# Patient Record
Sex: FEMALE | Race: WHITE | NOT HISPANIC OR LATINO | Employment: OTHER | RURAL
[De-identification: names, ages, dates, MRNs, and addresses within clinical notes are randomized per-mention and may not be internally consistent; named-entity substitution may affect disease eponyms.]

---

## 2022-08-22 ENCOUNTER — OFFICE VISIT (OUTPATIENT)
Dept: FAMILY MEDICINE CLINIC | Facility: CLINIC | Age: 62
End: 2022-08-22

## 2022-08-22 VITALS — DIASTOLIC BLOOD PRESSURE: 84 MMHG | SYSTOLIC BLOOD PRESSURE: 140 MMHG | WEIGHT: 144 LBS

## 2022-08-22 DIAGNOSIS — E53.8 B12 DEFICIENCY: ICD-10-CM

## 2022-08-22 DIAGNOSIS — Z13.220 LIPID SCREENING: ICD-10-CM

## 2022-08-22 DIAGNOSIS — E03.9 ACQUIRED HYPOTHYROIDISM: ICD-10-CM

## 2022-08-22 DIAGNOSIS — R53.82 CHRONIC FATIGUE: Primary | ICD-10-CM

## 2022-08-22 DIAGNOSIS — F33.1 MODERATE EPISODE OF RECURRENT MAJOR DEPRESSIVE DISORDER: ICD-10-CM

## 2022-08-22 PROCEDURE — 99213 OFFICE O/P EST LOW 20 MIN: CPT | Performed by: STUDENT IN AN ORGANIZED HEALTH CARE EDUCATION/TRAINING PROGRAM

## 2022-08-22 PROCEDURE — 36415 COLL VENOUS BLD VENIPUNCTURE: CPT | Performed by: STUDENT IN AN ORGANIZED HEALTH CARE EDUCATION/TRAINING PROGRAM

## 2022-08-22 RX ORDER — LEVOTHYROXINE SODIUM 137 UG/1
TABLET ORAL
COMMUNITY
Start: 2022-06-26 | End: 2022-10-18 | Stop reason: SDUPTHER

## 2022-08-22 NOTE — PROGRESS NOTES
Chief Complaint  Fatigue    Subjective          Celestina Sullivan presents to [facility]  History of Present Illness    She states that she has had some worsening fatigue over the past several months. She states that she has had more trouble with her ADHD and depression as well as more stress with her son. She states that sheis concerned about low B12, vitamin D, or her thyroid being off.     She states otherwise she is doing well at this time.  Her hip is doing significantly better since having her surgery.  She states that she has been up and moving, however motivation is what she is lacking.    She has seen psychiatry in the past and attempted to establish with [facility], however due to having poor Internet service at her house she was unable to do virtual visit and still received a bill for a last-minute cancellation or no-show she states that she has not seen anyone since attempting to get involved with this group.        Objective   Vital Signs:   /84 (BP Location: Left arm, Patient Position: Sitting, Cuff Size: Adult)   Wt 65.3 kg (144 lb)     There is no height or weight on file to calculate BMI.    Review of Systems    Past History:  Medical History: has a past medical history of Anxiety, Hypothyroidism, and Neurotic depression.   Surgical History: has a past surgical history that includes Joint replacement.   Family History: family history includes Cancer in her mother; Depression in her mother.   Social History: reports that she has been smoking cigarettes. She has been smoking about 2.00 packs per day. She has never used smokeless tobacco. She reports that she does not use drugs.       Current Outpatient Medications:   •  levothyroxine (SYNTHROID, LEVOTHROID) 137 MCG tablet, , Disp: , Rfl:     Allergies: Patient has no allergy information on record.    Physical Exam  Constitutional:       General: She is not in acute distress.     Appearance: She is not ill-appearing or  toxic-appearing.   HENT:      Head: Normocephalic and atraumatic.   Cardiovascular:      Rate and Rhythm: Normal rate and regular rhythm.      Heart sounds: No murmur heard.  Pulmonary:      Effort: Pulmonary effort is normal. No respiratory distress.   Neurological:      General: No focal deficit present.      Mental Status: She is alert and oriented to person, place, and time.   Psychiatric:         Mood and Affect: Mood normal.         Thought Content: Thought content normal.          Result Review :                   Assessment and Plan    Diagnoses and all orders for this visit:    1. Chronic fatigue (Primary)  -     Vitamin B12 & Folate; Future  -     Comprehensive Metabolic Panel; Future  -     CBC & Differential; Future  -     TSH; Future  -     T4, Free; Future  -     Vitamin B12 & Folate  -     Comprehensive Metabolic Panel  -     CBC & Differential  -     TSH  -     T4, Free    2. Acquired hypothyroidism  -     TSH; Future  -     T4, Free; Future  -     TSH  -     T4, Free    3. B12 deficiency  -     Vitamin B12 & Folate; Future  -     Vitamin B12 & Folate    4. Lipid screening  -     Lipid Panel; Future  -     Lipid Panel    5. Moderate episode of recurrent major depressive disorder (HCC)    Patient due for blood work at this time.  We will contact her with results.  Advised that if her blood work does not show any significant abnormalities metabolically I recommend that she see psychiatry for further evaluation and management of ADHD, anxiety and depression, and history of bipolar disorder.  She is agreeable to this, but would like to discuss her blood work first.        Follow Up   No follow-ups on file.  Patient was given instructions and counseling regarding her condition or for health maintenance advice. Please see specific information pulled into the AVS if appropriate.     Myah Ghosh, DO

## 2022-08-23 LAB
ALBUMIN SERPL-MCNC: 5 G/DL (ref 3.8–4.8)
ALBUMIN/GLOB SERPL: 1.9 {RATIO} (ref 1.2–2.2)
ALP SERPL-CCNC: 124 IU/L (ref 44–121)
ALT SERPL-CCNC: 12 IU/L (ref 0–32)
AST SERPL-CCNC: 17 IU/L (ref 0–40)
BASOPHILS # BLD AUTO: 0.1 X10E3/UL (ref 0–0.2)
BASOPHILS NFR BLD AUTO: 1 %
BILIRUB SERPL-MCNC: <0.2 MG/DL (ref 0–1.2)
BUN SERPL-MCNC: 13 MG/DL (ref 8–27)
BUN/CREAT SERPL: 17 (ref 12–28)
CALCIUM SERPL-MCNC: 9.7 MG/DL (ref 8.7–10.3)
CHLORIDE SERPL-SCNC: 100 MMOL/L (ref 96–106)
CHOLEST SERPL-MCNC: 314 MG/DL (ref 100–199)
CO2 SERPL-SCNC: 22 MMOL/L (ref 20–29)
CREAT SERPL-MCNC: 0.75 MG/DL (ref 0.57–1)
EGFRCR-CYS SERPLBLD CKD-EPI 2021: 89 ML/MIN/1.73
EOSINOPHIL # BLD AUTO: 0.2 X10E3/UL (ref 0–0.4)
EOSINOPHIL NFR BLD AUTO: 2 %
ERYTHROCYTE [DISTWIDTH] IN BLOOD BY AUTOMATED COUNT: 13.6 % (ref 11.7–15.4)
FOLATE SERPL-MCNC: 8 NG/ML
GLOBULIN SER CALC-MCNC: 2.7 G/DL (ref 1.5–4.5)
GLUCOSE SERPL-MCNC: 84 MG/DL (ref 65–99)
HCT VFR BLD AUTO: 40.8 % (ref 34–46.6)
HDLC SERPL-MCNC: 51 MG/DL
HGB BLD-MCNC: 14.2 G/DL (ref 11.1–15.9)
IMM GRANULOCYTES # BLD AUTO: 0 X10E3/UL (ref 0–0.1)
IMM GRANULOCYTES NFR BLD AUTO: 1 %
LDLC SERPL CALC-MCNC: 175 MG/DL (ref 0–99)
LYMPHOCYTES # BLD AUTO: 2.5 X10E3/UL (ref 0.7–3.1)
LYMPHOCYTES NFR BLD AUTO: 28 %
MCH RBC QN AUTO: 31.7 PG (ref 26.6–33)
MCHC RBC AUTO-ENTMCNC: 34.8 G/DL (ref 31.5–35.7)
MCV RBC AUTO: 91 FL (ref 79–97)
MONOCYTES # BLD AUTO: 0.8 X10E3/UL (ref 0.1–0.9)
MONOCYTES NFR BLD AUTO: 9 %
NEUTROPHILS # BLD AUTO: 5.3 X10E3/UL (ref 1.4–7)
NEUTROPHILS NFR BLD AUTO: 59 %
PLATELET # BLD AUTO: 253 X10E3/UL (ref 150–450)
POTASSIUM SERPL-SCNC: 4.5 MMOL/L (ref 3.5–5.2)
PROT SERPL-MCNC: 7.7 G/DL (ref 6–8.5)
RBC # BLD AUTO: 4.48 X10E6/UL (ref 3.77–5.28)
SODIUM SERPL-SCNC: 139 MMOL/L (ref 134–144)
T4 FREE SERPL-MCNC: 1.43 NG/DL (ref 0.82–1.77)
TRIGL SERPL-MCNC: 442 MG/DL (ref 0–149)
TSH SERPL DL<=0.005 MIU/L-ACNC: 4.34 UIU/ML (ref 0.45–4.5)
VIT B12 SERPL-MCNC: 242 PG/ML (ref 232–1245)
VLDLC SERPL CALC-MCNC: 88 MG/DL (ref 5–40)
WBC # BLD AUTO: 8.9 X10E3/UL (ref 3.4–10.8)

## 2022-08-24 ENCOUNTER — TELEPHONE (OUTPATIENT)
Dept: FAMILY MEDICINE CLINIC | Facility: CLINIC | Age: 62
End: 2022-08-24

## 2022-08-24 NOTE — TELEPHONE ENCOUNTER
DELETE AFTER REVIEWING: Telephone encounter to be sent to the clinical pool.    Caller: Celestina Sullivan    Relationship: Self    Best call back number: [phone]     Caller requesting test results: CELESTINA    What test was performed: LABS    When was the test performed: 8/22/22    Where was the test performed: IN OFFICE    Additional notes: PT WOULD LIKE A FOLLOW UP ON THE RESULTS OF HER LABS.

## 2022-08-24 NOTE — TELEPHONE ENCOUNTER
I have sent her a letter for this. Blood work was overall ok. Ok to read her the recommendations at the bottom of the letter. Thanks.

## 2022-08-25 RX ORDER — ROSUVASTATIN CALCIUM 10 MG/1
10 TABLET, COATED ORAL DAILY
Qty: 90 TABLET | Refills: 1 | Status: SHIPPED | OUTPATIENT
Start: 2022-08-25 | End: 2023-03-09

## 2022-10-17 ENCOUNTER — TELEPHONE (OUTPATIENT)
Dept: FAMILY MEDICINE CLINIC | Facility: CLINIC | Age: 62
End: 2022-10-17

## 2022-10-17 NOTE — TELEPHONE ENCOUNTER
Medication requested (name and dose): LEVOTHYROXINE 137 mcg    Pharmacy where request should be sent: [facility], [address], [place] [address]  Ph: [phone]    Additional details provided by patient: Patient is leaving out of town tomorrow 10/17/2022 and has 3 tablets left.  She is requesting that it be sent to pharmacy above.    Best call back number:  ( [phone]    Does the patient have less than a 3 day supply:  [] Yes  [x] No    Kylie Monteiro Rep  10/17/22, 17:23 EDT    {

## 2022-10-18 RX ORDER — LEVOTHYROXINE SODIUM 137 UG/1
137 TABLET ORAL DAILY
Qty: 30 TABLET | Refills: 0 | Status: SHIPPED | OUTPATIENT
Start: 2022-10-18 | End: 2022-10-19

## 2022-10-18 NOTE — TELEPHONE ENCOUNTER
I cant find the one that she wants it sent to. I have sent a 30 day supply to her local pharmacy. Please call her and let her know that she can likely pick it up later this evening.

## 2022-10-19 RX ORDER — LEVOTHYROXINE SODIUM 137 UG/1
137 TABLET ORAL DAILY
Qty: 30 TABLET | Refills: 0 | Status: SHIPPED | OUTPATIENT
Start: 2022-10-19 | End: 2022-12-05 | Stop reason: SDUPTHER

## 2022-10-19 NOTE — TELEPHONE ENCOUNTER
HUB TO READ    LEFT DETAILED VM FOR PT. CAN'T FIND THIS PHARMACY IN [place], PLEASE CHECK PHARMACY INFORMATION. WE DID SEND A REFILL TO HER LOCAL PHARMACY BUT SHE IS ALREADY OUT OF TOWN.

## 2022-12-05 RX ORDER — LEVOTHYROXINE SODIUM 137 UG/1
137 TABLET ORAL DAILY
Qty: 30 TABLET | Refills: 0 | Status: SHIPPED | OUTPATIENT
Start: 2022-12-05 | End: 2023-01-13 | Stop reason: SDUPTHER

## 2022-12-05 NOTE — TELEPHONE ENCOUNTER
Caller: Celestina Sullivan    Relationship: Self    Best call back number:[phone]    Requested Prescriptions:   Requested Prescriptions     Pending Prescriptions Disp Refills   • levothyroxine (SYNTHROID, LEVOTHROID) 137 MCG tablet 30 tablet 0     Sig: Take 1 tablet by mouth Daily.        Pharmacy where request should be sent: [facility]  [id number] - [place], [place] - [address]  - [phone]  - [phone] FX     Additional details provided by patient: PATIENT IS STILL IN [place].  REQUESTING REFILLS    Does the patient have less than a 3 day supply:  [x] Yes  [] No    Would you like a call back once the refill request has been completed: [x] Yes [] No    If the office needs to give you a call back, can they leave a voicemail: [x] Yes [] No    Kylie Solano Rep   12/05/22 15:35 EST

## 2023-01-13 NOTE — TELEPHONE ENCOUNTER
Caller: Celestina Sullivan    Relationship: Self    Best call back number: [phone]    Requested Prescriptions:   Requested Prescriptions     Pending Prescriptions Disp Refills   • levothyroxine (SYNTHROID, LEVOTHROID) 137 MCG tablet 30 tablet 0     Sig: Take 1 tablet by mouth Daily.      ACYCLOVIR OINTMENT  Pharmacy where request should be sent:  [facility]  [id number] - [place], [place] - [address] - [phone] [facility] [phone] FX        Additional details provided by patient: PT STATED  THAT SHE HAS A FEVER BLISTER BREAKOUT AND REQUEST RX ACYCLOVIR OINTMENT , STATED THAT SHE HAS BEEN PRESCRIBED RX PREVIOUSLY AND IS IN [place], IS EXPECTED TO BE BACK HOME NEXT WEEK    Does the patient have less than a 3 day supply:  [x] Yes  [] No    Would you like a call back once the refill request has been completed: [x] Yes [] No    If the office needs to give you a call back, can they leave a voicemail: [x] Yes [] No    Kylie Guy Rep   01/13/23 15:26 EST

## 2023-01-16 RX ORDER — LEVOTHYROXINE SODIUM 137 UG/1
137 TABLET ORAL DAILY
Qty: 30 TABLET | Refills: 0 | Status: SHIPPED | OUTPATIENT
Start: 2023-01-16 | End: 2023-01-19 | Stop reason: SDUPTHER

## 2023-01-19 ENCOUNTER — TELEPHONE (OUTPATIENT)
Dept: FAMILY MEDICINE CLINIC | Facility: CLINIC | Age: 63
End: 2023-01-19

## 2023-01-19 RX ORDER — LEVOTHYROXINE SODIUM 137 UG/1
137 TABLET ORAL DAILY
Qty: 30 TABLET | Refills: 0 | Status: SHIPPED | OUTPATIENT
Start: 2023-01-19 | End: 2023-03-06 | Stop reason: SDUPTHER

## 2023-03-06 RX ORDER — LEVOTHYROXINE SODIUM 137 UG/1
137 TABLET ORAL DAILY
Qty: 30 TABLET | Refills: 0 | Status: SHIPPED | OUTPATIENT
Start: 2023-03-06

## 2023-03-09 ENCOUNTER — OFFICE VISIT (OUTPATIENT)
Dept: FAMILY MEDICINE CLINIC | Facility: CLINIC | Age: 63
End: 2023-03-09
Payer: MEDICARE

## 2023-03-09 VITALS — SYSTOLIC BLOOD PRESSURE: 144 MMHG | WEIGHT: 144 LBS | DIASTOLIC BLOOD PRESSURE: 90 MMHG

## 2023-03-09 DIAGNOSIS — Z11.59 ENCOUNTER FOR HEPATITIS C SCREENING TEST FOR LOW RISK PATIENT: ICD-10-CM

## 2023-03-09 DIAGNOSIS — R73.09 ELEVATED GLUCOSE: ICD-10-CM

## 2023-03-09 DIAGNOSIS — E53.8 B12 DEFICIENCY: ICD-10-CM

## 2023-03-09 DIAGNOSIS — Z13.220 LIPID SCREENING: ICD-10-CM

## 2023-03-09 DIAGNOSIS — Z12.11 COLON CANCER SCREENING: ICD-10-CM

## 2023-03-09 DIAGNOSIS — E55.9 VITAMIN D DEFICIENCY: ICD-10-CM

## 2023-03-09 DIAGNOSIS — E03.9 ACQUIRED HYPOTHYROIDISM: ICD-10-CM

## 2023-03-09 DIAGNOSIS — Z12.31 ENCOUNTER FOR SCREENING MAMMOGRAM FOR MALIGNANT NEOPLASM OF BREAST: ICD-10-CM

## 2023-03-09 DIAGNOSIS — R53.82 CHRONIC FATIGUE: Primary | ICD-10-CM

## 2023-03-09 PROBLEM — E78.2 MIXED HYPERLIPIDEMIA: Status: ACTIVE | Noted: 2023-03-09

## 2023-03-09 PROBLEM — Z72.0 TOBACCO USER: Status: ACTIVE | Noted: 2023-03-09

## 2023-03-09 PROBLEM — G56.00 CARPAL TUNNEL SYNDROME: Status: ACTIVE | Noted: 2023-03-09

## 2023-03-09 PROBLEM — F34.1 DYSTHYMIA: Status: ACTIVE | Noted: 2023-03-09

## 2023-03-09 PROBLEM — E27.9 DISORDER OF ADRENAL GLAND: Status: ACTIVE | Noted: 2023-03-09

## 2023-03-09 PROBLEM — F90.9 ATTENTION DEFICIT HYPERACTIVITY DISORDER: Status: ACTIVE | Noted: 2023-03-09

## 2023-03-09 PROCEDURE — 1159F MED LIST DOCD IN RCRD: CPT | Performed by: STUDENT IN AN ORGANIZED HEALTH CARE EDUCATION/TRAINING PROGRAM

## 2023-03-09 PROCEDURE — 99396 PREV VISIT EST AGE 40-64: CPT | Performed by: STUDENT IN AN ORGANIZED HEALTH CARE EDUCATION/TRAINING PROGRAM

## 2023-03-09 RX ORDER — IBUPROFEN 800 MG/1
800 TABLET ORAL EVERY 6 HOURS PRN
Qty: 60 TABLET | Refills: 3 | Status: SHIPPED | OUTPATIENT
Start: 2023-03-09

## 2023-03-09 RX ORDER — ROSUVASTATIN CALCIUM 10 MG/1
10 TABLET, COATED ORAL DAILY
Qty: 90 TABLET | Refills: 1 | Status: SHIPPED | OUTPATIENT
Start: 2023-03-09

## 2023-03-09 NOTE — PROGRESS NOTES
"Chief Complaint  Annual Exam    Subjective          Celestina Sullivan presents to [facility]  History of Present Illness    Patient is here for a physical.     She states that she has been having difficulty getting her levothyroxine and called this in on Monday however she has not picked it up despite it having been filled.  She states she is tolerating this medication without any side effects.    She has not been taking her Crestor.  She states that she stopped taking this because she thought that her blood work was \"borderline\".  She is due for follow-up blood work at this time.    He is due for breast cancer and colon cancer screening at this time.        Objective   Vital Signs:   /90 (BP Location: Right arm, Patient Position: Sitting, Cuff Size: Adult)   Wt 65.3 kg (144 lb)     There is no height or weight on file to calculate BMI.    Review of Systems    Past History:  Medical History: has a past medical history of Anxiety, Hypothyroidism, and Neurotic depression.   Surgical History: has a past surgical history that includes Joint replacement.   Family History: family history includes Cancer in her mother; Depression in her mother.   Social History: reports that she has been smoking cigarettes. She has been smoking an average of 2 packs per day. She has never used smokeless tobacco. She reports that she does not use drugs.      Current Outpatient Medications:   •  rosuvastatin (Crestor) 10 MG tablet, Take 1 tablet by mouth Daily., Disp: 90 tablet, Rfl: 1  •  ibuprofen (ADVIL,MOTRIN) 800 MG tablet, Take 1 tablet by mouth Every 6 (Six) Hours As Needed for Moderate Pain., Disp: 60 tablet, Rfl: 3  •  levothyroxine (SYNTHROID, LEVOTHROID) 137 MCG tablet, Take 1 tablet by mouth Daily., Disp: 30 tablet, Rfl: 0    Allergies: Patient has no known allergies.    Physical Exam  Constitutional:       General: She is not in acute distress.     Appearance: She is not ill-appearing or " toxic-appearing.   HENT:      Head: Normocephalic and atraumatic.   Cardiovascular:      Rate and Rhythm: Normal rate and regular rhythm.      Heart sounds: No murmur heard.  Pulmonary:      Effort: Pulmonary effort is normal. No respiratory distress.   Neurological:      General: No focal deficit present.      Mental Status: She is alert and oriented to person, place, and time.   Psychiatric:         Mood and Affect: Mood normal.         Thought Content: Thought content normal.          Result Review :                   Assessment and Plan    Diagnoses and all orders for this visit:    1. Chronic fatigue (Primary)  -     Comprehensive Metabolic Panel; Future  -     CBC & Differential; Future    2. Acquired hypothyroidism  -     TSH; Future  -     T4, Free; Future    3. B12 deficiency    4. Lipid screening  -     Lipid Panel; Future    5. Encounter for hepatitis C screening test for low risk patient  -     Hepatitis C antibody; Future    6. Encounter for screening mammogram for malignant neoplasm of breast  -     Mammo Screening Bilateral With CAD; Future    7. Colon cancer screening  -     Cologuard - Stool, Per Rectum; Future    8. Elevated glucose  -     Hemoglobin A1c; Future    9. Vitamin D deficiency  -     Vitamin D,25-Hydroxy; Future    Other orders  -     ibuprofen (ADVIL,MOTRIN) 800 MG tablet; Take 1 tablet by mouth Every 6 (Six) Hours As Needed for Moderate Pain.  Dispense: 60 tablet; Refill: 3  -     rosuvastatin (Crestor) 10 MG tablet; Take 1 tablet by mouth Daily.  Dispense: 90 tablet; Refill: 1    Discussed with patient that she has high cholesterol. Not borderline. Recommended that she restart the cholesterol.  She states that she did not have any side effects on medication when she was taking.    Blood work ordered and will contact patient with results when available.    Past medical and surgical history as well as allergies, family history and social history were reviewed, and discussed with  patient.  Chronic conditions were reviewed as well as medications.   Anticipatory guidance handouts including healthy diet, health maintenance, as well as regular exercise and general instructions were given via [employer], and patient was able to ask questions and discuss any concerns.            Follow Up   No follow-ups on file.  Patient was given instructions and counseling regarding her condition or for health maintenance advice. Please see specific information pulled into the AVS if appropriate.     Myah Ghosh, DO

## 2023-04-10 DIAGNOSIS — R92.8 ABNORMAL MAMMOGRAM: Primary | ICD-10-CM

## 2023-04-18 ENCOUNTER — TELEPHONE (OUTPATIENT)
Dept: FAMILY MEDICINE CLINIC | Facility: CLINIC | Age: 63
End: 2023-04-18
Payer: MEDICARE

## 2023-04-24 ENCOUNTER — TELEPHONE (OUTPATIENT)
Dept: FAMILY MEDICINE CLINIC | Facility: CLINIC | Age: 63
End: 2023-04-24

## 2023-04-24 NOTE — TELEPHONE ENCOUNTER
Caller: Celestina Sullivan    Relationship: Self    Best call back number: [phone]      What specialty or service is being requested:     BREAST ULTRASOUND    What is the provider, practice or medical service name: [facility]     Any additional details:     PLEASE CALL TO DISCUSS

## 2023-04-25 ENCOUNTER — TELEPHONE (OUTPATIENT)
Dept: FAMILY MEDICINE CLINIC | Facility: CLINIC | Age: 63
End: 2023-04-25
Payer: MEDICARE

## 2023-04-25 NOTE — TELEPHONE ENCOUNTER
Caller: Nate Celestina L    Relationship: Self    Best call back number: [phone]    What is the medical concern/diagnosis: ANXIETY, DEPRESSION SEVERE    What specialty or service is being requested: BEHAVIORAL HEALTH    What is the provider, practice or medical service name: WHO YOU REFER HER TO ASAP    Any additional details: PATIENT REQUESTS APPOINTMENT WITH BEHAVIORAL HEALTH ASAP. PLEASE CALL HER TO ADVISE.

## 2023-04-26 ENCOUNTER — OFFICE VISIT (OUTPATIENT)
Dept: FAMILY MEDICINE CLINIC | Facility: CLINIC | Age: 63
End: 2023-04-26
Payer: MEDICARE

## 2023-04-26 VITALS
WEIGHT: 145 LBS | SYSTOLIC BLOOD PRESSURE: 150 MMHG | OXYGEN SATURATION: 97 % | DIASTOLIC BLOOD PRESSURE: 82 MMHG | HEART RATE: 97 BPM

## 2023-04-26 DIAGNOSIS — F90.2 ATTENTION DEFICIT HYPERACTIVITY DISORDER (ADHD), COMBINED TYPE: ICD-10-CM

## 2023-04-26 DIAGNOSIS — F31.60 BIPOLAR 1 DISORDER, MIXED: Primary | ICD-10-CM

## 2023-04-26 PROCEDURE — 99213 OFFICE O/P EST LOW 20 MIN: CPT | Performed by: STUDENT IN AN ORGANIZED HEALTH CARE EDUCATION/TRAINING PROGRAM

## 2023-04-26 NOTE — PROGRESS NOTES
Chief Complaint  Depression    Subjective          Celestina Sullivan presents to [facility]  History of Present Illness    Patient states that she has been having trouble with her mood. She states that she has minimal motivation and has been having trouble getting out of bed. She feels like her ADHD has been worsened since she has been more depressed. She states that she would like to se a mental health preofessional to help with ehr symptoms as well.  She has not been on medication before mood in some time.        Objective   Vital Signs:   /82 (BP Location: Right arm, Patient Position: Sitting, Cuff Size: Adult)   Pulse 97   Wt 65.8 kg (145 lb)   SpO2 97%     There is no height or weight on file to calculate BMI.    Review of Systems    Past History:  Medical History: has a past medical history of Anxiety, Hypothyroidism, and Neurotic depression.   Surgical History: has a past surgical history that includes Joint replacement.   Family History: family history includes Cancer in her mother; Depression in her mother.   Social History: reports that she has been smoking cigarettes. She has been smoking an average of 2 packs per day. She has never used smokeless tobacco. She reports that she does not use drugs.      Current Outpatient Medications:   •  ibuprofen (ADVIL,MOTRIN) 800 MG tablet, Take 1 tablet by mouth Every 6 (Six) Hours As Needed for Moderate Pain., Disp: 60 tablet, Rfl: 3  •  levothyroxine (SYNTHROID, LEVOTHROID) 137 MCG tablet, Take 1 tablet by mouth Daily., Disp: 30 tablet, Rfl: 0  •  rosuvastatin (Crestor) 10 MG tablet, Take 1 tablet by mouth Daily., Disp: 90 tablet, Rfl: 1    Allergies: Patient has no known allergies.    Physical Exam  Constitutional:       General: She is not in acute distress.     Appearance: She is not ill-appearing or toxic-appearing.   HENT:      Head: Normocephalic and atraumatic.   Pulmonary:      Effort: Pulmonary effort is normal. No  respiratory distress.   Neurological:      General: No focal deficit present.      Mental Status: She is alert and oriented to person, place, and time.   Psychiatric:         Thought Content: Thought content normal.      Comments: Push speech.  Flat affect.          Result Review :                   Assessment and Plan    Diagnoses and all orders for this visit:    1. Bipolar 1 disorder, mixed (Primary)  -     Ambulatory Referral to Psychiatry    2. Attention deficit hyperactivity disorder (ADHD), combined type  -     Ambulatory Referral to Psychiatry    Referral placed for psychiatry, and patient recommended to call in the office today to get scheduled.  We will give samples of Vraylar 1.5 mg to see if this helps with her symptoms of depression, anxiety, and uncontrolled bipolar disorder.  Anticipate that the ADHD worsening is secondary to mood worsening.    Follow-up in 1 month or sooner if new or worsening symptoms.       Follow Up   No follow-ups on file.  Patient was given instructions and counseling regarding her condition or for health maintenance advice. Please see specific information pulled into the AVS if appropriate.     Myah Ghosh, DO

## 2023-05-03 ENCOUNTER — TELEMEDICINE (OUTPATIENT)
Dept: PSYCHIATRY | Facility: CLINIC | Age: 63
End: 2023-05-03
Payer: MEDICARE

## 2023-05-03 DIAGNOSIS — Z79.899 OTHER LONG TERM (CURRENT) DRUG THERAPY: ICD-10-CM

## 2023-05-03 DIAGNOSIS — F90.2 ATTENTION DEFICIT HYPERACTIVITY DISORDER (ADHD), COMBINED TYPE: Primary | ICD-10-CM

## 2023-05-03 PROCEDURE — 90792 PSYCH DIAG EVAL W/MED SRVCS: CPT | Performed by: NURSE PRACTITIONER

## 2023-05-03 PROCEDURE — 1160F RVW MEDS BY RX/DR IN RCRD: CPT | Performed by: NURSE PRACTITIONER

## 2023-05-03 PROCEDURE — 1159F MED LIST DOCD IN RCRD: CPT | Performed by: NURSE PRACTITIONER

## 2023-05-03 RX ORDER — METHYLPHENIDATE HYDROCHLORIDE 10 MG/1
10 TABLET ORAL 2 TIMES DAILY
Qty: 60 TABLET | Refills: 0 | Status: SHIPPED | OUTPATIENT
Start: 2023-05-03 | End: 2024-05-02

## 2023-05-03 NOTE — PROGRESS NOTES
Patient Name: Celestina Sullivan  MRN: [id number]   :  1960     Referring Physician: Myah Ghosh DO    This provider is located in her home office through the [facility] (through [facility]), [address] [place], [address] using a secure [employer] Video Visit through [employer]. Patient is being seen remotely via telehealth at their home address in [place], and stated they are in a secure environment for this session. The patient's condition being diagnosed/treated is appropriate for telemedicine. The provider identified herself as well as her credentials.   The patient, and/or patients guardian, consent to be seen remotely, and when consent is given they understand that the consent allows for patient identifiable information to be sent to a third party as needed.   They may refuse to be seen remotely at any time. The electronic data is encrypted and password protected, and the patient and/or guardian has been advised of the potential risks to privacy not withstanding such measures.    You have chosen to receive care through a telehealth visit.  Do you consent to use a video/audio connection for your medical care today? Yes    Chief Complaint:     ICD-10-CM ICD-9-CM   1. Attention deficit hyperactivity disorder (ADHD), combined type  F90.2 314.01   2. Other long term (current) drug therapy  Z79.899 V58.69       HPI:   Celestina Sullivan is a 63 y.o. female who is here today for initial evaluation of ADHD.  Patient states she has previously been diagnosed with ADHD and bipolar disorder.  Patient questions the bipolar disorder as any time she takes the medication for this it makes her extremely tired and she is unable to function.  Patient states she has depression when she is not able to focus and complete tasks.  States she is originally from [place] and moved to [place] 4 years ago.  Has 14 grandchildren.  Lives by the [facility].  Has many family dynamics and  stressors.  Patient has no motivation and very poor executive functioning.  Patient has tried Strattera in the past which caused significant nausea, headaches, and had no effect on ADHD.  Patient has trialed phentermine before and noticed this helped her ADHD.  Patient has recently applied for a job at [employer] and got an interview.  Patient is worried about her inability to focus and complete tasks.    Past Medical History:   Past Medical History:   Diagnosis Date   • Anxiety    • Hypothyroidism    • Neurotic depression        Past Surgical History:   Past Surgical History:   Procedure Laterality Date   • JOINT REPLACEMENT         Social History:   Social History     Socioeconomic History   • Marital status:    Tobacco Use   • Smoking status: Every Day     Packs/day: 2.00     Types: Cigarettes   • Smokeless tobacco: Never   Vaping Use   • Vaping Use: Never used   Substance and Sexual Activity   • Drug use: Never   • Sexual activity: Defer       Family History:  Family History   Problem Relation Age of Onset   • Cancer Mother    • Depression Mother        Allergy:  No Known Allergies    Current Medications:   Current Outpatient Medications   Medication Sig Dispense Refill   • ibuprofen (ADVIL,MOTRIN) 800 MG tablet Take 1 tablet by mouth Every 6 (Six) Hours As Needed for Moderate Pain. 60 tablet 3   • levothyroxine (SYNTHROID, LEVOTHROID) 137 MCG tablet Take 1 tablet by mouth Daily. 30 tablet 0   • methylphenidate (Ritalin) 10 MG tablet Take 1 tablet by mouth 2 (Two) Times a Day. 60 tablet 0   • rosuvastatin (Crestor) 10 MG tablet Take 1 tablet by mouth Daily. 90 tablet 1     No current facility-administered medications for this visit.       Lab Results:   Results Encounter on 03/09/2023   Component Date Value Ref Range Status   • Cologuard 04/11/2023 Negative  Negative Final    Comment:   NEGATIVE TEST RESULT. A negative Cologuard result indicates a low likelihood that a colorectal cancer (CRC) or advanced  adenoma (adenomatous polyps with more advanced pre-malignant features)  is present. The chance that a person with a negative Cologuard test has a colorectal cancer is less than 1 in 1500 (negative predictive value >99.9%) or has an  advanced adenoma is less than  5.3% (negative predictive value 94.7%). These data are based on a prospective cross-sectional study of 10,000 individuals at average risk for colorectal cancer who were screened with both Cologuard and colonoscopy. (Farrah CRISTINA et al, N Engl J Med 2014;370(14):4685-6867) The normal value (reference range) for this assay is negative.    COLOGUARD RE-SCREENING RECOMMENDATION: Periodic colorectal cancer screening is an important part of preventive healthcare for asymptomatic individuals at average risk for colorectal cancer.  Following a negative Cologuard result, the American Cancer Society and U.S.                            Multi-Society Task Force screening guidelines recommend a Cologuard re-screening interval of 3 years.   References: American Cancer Society Guideline for Colorectal Cancer Screening: https://www.cancer.org/cancer/colon-rectal-cancer/uzafyflxl-cmprnjadj-ngkeamj/acs-recommendations.html.; Jared DK, Janessa BIGGS, Ti OLSONK, Colorectal Cancer Screening: Recommendations for Physicians and Patients from the U.S. Multi-Society Task Force on Colorectal Cancer Screening , Am J Gastroenterology 2017; 112:0589-1394.    TEST DESCRIPTION: Composite algorithmic analysis of stool DNA-biomarkers with hemoglobin immunoassay.   Quantitative values of individual biomarkers are not reportable and are not associated with individual biomarker result reference ranges. Cologuard is intended for colorectal cancer screening of adults of either sex, 45 years or older, who are at average-risk for colorectal cancer (CRC). Cologuard has been approved for use by the U.S. FDA. The performance of Cologuard was                            established in a cross sectional  study of average-risk adults aged 50-84. Cologuard performance in patients ages 45 to 49 years was estimated by sub-group analysis of near-age groups. Colonoscopies performed for a positive result may find as the most clinically significant lesion: colorectal cancer [4.0%], advanced adenoma (including sessile serrated polyps greater than or equal to 1cm diameter) [20%] or non- advanced adenoma [31%]; or no colorectal neoplasia [45%]. These estimates are derived from a prospective cross-sectional screening study of 10,000 individuals at average risk for colorectal cancer who were screened with both Cologuard and colonoscopy. (Farrah Omalley al, N Engl J Med 2014;370(14):2752-6261.) Cologuard may produce a false negative or false positive result (no colorectal cancer or precancerous polyp present at colonoscopy follow up). A negative Cologuard test result does not guarantee the absence of CRC or advanced adenoma (pre-cancer). The current Cologuard                            screening interval is every 3 years. (American Cancer Society and U.S. Multi-Society Task Force). Cologuard performance data in a 10,000 patient pivotal study using colonoscopy as the reference method can be accessed at the following location: www.[employer]/results. Additional description of the Cologuard test process, warnings and precautions can be found at www.[employer].         Review of Symptoms:   Review of Systems   Constitutional: Negative for activity change, appetite change, fatigue, unexpected weight gain and unexpected weight loss.   Respiratory: Negative for shortness of breath and wheezing.    Gastrointestinal: Negative for constipation, diarrhea, nausea and vomiting.   Musculoskeletal: Negative for gait problem.   Skin: Negative for dry skin and rash.   Neurological: Negative for dizziness, speech difficulty, weakness, light-headedness, headache, memory problem and confusion.   Psychiatric/Behavioral: Positive for decreased  concentration, positive for hyperactivity and stress. Negative for agitation, behavioral problems, dysphoric mood, hallucinations, self-injury, sleep disturbance, suicidal ideas and depressed mood. The patient is not nervous/anxious.        Physical Exam:   Physical Exam  Constitutional:       General: She is not in acute distress.     Appearance: She is well-developed. She is not diaphoretic.   HENT:      Head: Normocephalic and atraumatic.   Eyes:      Conjunctiva/sclera: Conjunctivae normal.   Pulmonary:      Effort: Pulmonary effort is normal. No respiratory distress.   Musculoskeletal:         General: Normal range of motion.      Cervical back: Full passive range of motion without pain and normal range of motion.   Neurological:      Mental Status: She is alert and oriented to person, place, and time.   Psychiatric:         Mood and Affect: Mood is not anxious or depressed. Affect is not labile, blunt, angry or inappropriate.         Speech: Speech is not rapid and pressured or tangential.         Behavior: Behavior normal. Behavior is not agitated, slowed, aggressive, withdrawn, hyperactive or combative. Behavior is cooperative.         Thought Content: Thought content normal. Thought content is not paranoid or delusional. Thought content does not include homicidal or suicidal ideation. Thought content does not include homicidal or suicidal plan.         Judgment: Judgment normal.       There were no vitals taken for this visit.  There is no height or weight on file to calculate BMI. Video appt unable to obtain.     Mental Status Exam:   Appearance: appropriate  Hygiene:   good  Cooperation:  Cooperative  Eye Contact:  Good  Psychomotor Behavior:  Hyperactive  Mood:sad  Affect:  Appropriate  Hopelessness: 2  Speech:  Rapid  Thought Process:  Goal directed  Thought Content:  Normal  Suicidal:  None  Homicidal:  None  Hallucinations:  None  Delusion:  None  Memory:  Intact  Orientation:  Person, Place, Time and  Situation  Reliability:  good  Insight:  Good  Judgement:  Good  Impulse Control:  Good    PHQ-9 Depression Screening  Little interest or pleasure in doing things?     Feeling down, depressed, or hopeless?     Trouble falling or staying asleep, or sleeping too much?     Feeling tired or having little energy?     Poor appetite or overeating?     Feeling bad about yourself - or that you are a failure or have let yourself or your family down?     Trouble concentrating on things, such as reading the newspaper or watching television?     Moving or speaking so slowly that other people could have noticed? Or the opposite - being so fidgety or restless that you have been moving around a lot more than usual?     Thoughts that you would be better off dead, or of hurting yourself in some way?     PHQ-9 Total Score     If you checked off any problems, how difficult have these problems made it for you to do your work, take care of things at home, or get along with other people?        Reviewed EDUARDO #     Assessment/Plan:   Diagnoses and all orders for this visit:    1. Attention deficit hyperactivity disorder (ADHD), combined type (Primary)  -     methylphenidate (Ritalin) 10 MG tablet; Take 1 tablet by mouth 2 (Two) Times a Day.  Dispense: 60 tablet; Refill: 0  -     Urine Drug Screen - Urine, Clean Catch; Future    2. Other long term (current) drug therapy  -     Urine Drug Screen - Urine, Clean Catch; Future    Patient is aware of the upcoming law change with prescribing controlled substances without an in person appointment.  Wants to go ahead and start something today as she feels she is in crisis with her ADHD.  We will start Ritalin 10 mg twice a day.  We will patient is aware she may have to find an in person provider to prescribe this.  Will obtain a urine drug screen.    A psychological evaluation was conducted in order to assess past and current level of functioning. Areas assessed included, but were not limited to:  perception of social support, perception of ability to face and deal with challenges in life (positive functioning), anxiety symptoms, depressive symptoms, perspective on beliefs/belief system, coping skills for stress, intelligence level,  Therapeutic rapport was established. Interventions conducted today were geared towards incorporating medication management along with support for continued therapy. Education was also provided as to the med management with this provider and what to expect in subsequent sessions.    We discussed risks, benefits,goals and side effects of the above medication and the patient was agreeable with the plan.Patient was educated on the importance of compliance with treatment and follow-up appointments. Patient is aware to contact the [facility] [phone] with any worsening of symptoms. To call for questions or concerns and return early if necessary. Patent is agreeable to go to the Emergency Department or call 911 should they begin SI/HI.     Part of this note may be an electronic transcription/translation of spoken language to printed text using the Dragon Dictation System.    Return in about 3 weeks (around 5/24/2023) for Follow Up 30 min, Video visit.    TYLER Vasquez

## 2023-05-11 DIAGNOSIS — R92.8 ABNORMAL MAMMOGRAM: Primary | ICD-10-CM

## 2023-05-18 RX ORDER — LEVOTHYROXINE SODIUM 137 UG/1
TABLET ORAL
Qty: 30 TABLET | Refills: 0 | Status: SHIPPED | OUTPATIENT
Start: 2023-05-18

## 2023-05-24 ENCOUNTER — TELEMEDICINE (OUTPATIENT)
Dept: PSYCHIATRY | Facility: CLINIC | Age: 63
End: 2023-05-24
Payer: MEDICARE

## 2023-05-24 DIAGNOSIS — F90.2 ATTENTION DEFICIT HYPERACTIVITY DISORDER (ADHD), COMBINED TYPE: Primary | ICD-10-CM

## 2023-05-24 DIAGNOSIS — R92.8 ABNORMAL MAMMOGRAM: Primary | ICD-10-CM

## 2023-05-24 PROCEDURE — 1159F MED LIST DOCD IN RCRD: CPT | Performed by: NURSE PRACTITIONER

## 2023-05-24 PROCEDURE — 1160F RVW MEDS BY RX/DR IN RCRD: CPT | Performed by: NURSE PRACTITIONER

## 2023-05-24 PROCEDURE — 99214 OFFICE O/P EST MOD 30 MIN: CPT | Performed by: NURSE PRACTITIONER

## 2023-05-24 RX ORDER — DEXMETHYLPHENIDATE HYDROCHLORIDE 5 MG/1
TABLET ORAL
Qty: 60 TABLET | Refills: 0 | Status: SHIPPED | OUTPATIENT
Start: 2023-05-24

## 2023-05-24 NOTE — PROGRESS NOTES
Patient Name: Celestina Sullivan  MRN: [id number]   :  1960     This provider is located at her home office through the [facility] (through [facility]), [address], [address] using a secure [employer] Video Visit through [employer]. Patient is being seen remotely via telehealth at their home address in [place], and stated they are in a secure environment for this session. The patient's condition being diagnosed/treated is appropriate for telemedicine. The provider identified herself as well as her credentials.   The patient, and/or patients guardian, consent to be seen remotely, and when consent is given they understand that the consent allows for patient identifiable information to be sent to a third party as needed.   They may refuse to be seen remotely at any time. The electronic data is encrypted and password protected, and the patient and/or guardian has been advised of the potential risks to privacy not withstanding such measures.    You have chosen to receive care through a telehealth visit.  Do you consent to use a video/audio connection for your medical care today? Yes    Chief Complaint:      ICD-10-CM ICD-9-CM   1. Attention deficit hyperactivity disorder (ADHD), combined type  F90.2 314.01       History of Present Illness: Celestina Sullivan is a 63 y.o. female is here today for medication management follow up.  Patient states depression is much better however her heart rate is up and feels very jittery.  Notes that she is smoking more.  No problem with appetite and no trouble with sleeping.  Patient states she is shaking her foot a lot.  Patient states when she took phentermine it did not cause that.    The following portions of the patient's history were reviewed and updated as appropriate: allergies, current medications, past family history, past medical history, past social history, past surgical history and problem list.    Review of  Systems;;  Review of Systems   Constitutional: Negative for activity change, appetite change, fatigue, unexpected weight gain and unexpected weight loss.   Respiratory: Negative for shortness of breath and wheezing.    Gastrointestinal: Negative for constipation, diarrhea, nausea and vomiting.   Musculoskeletal: Negative for gait problem.   Skin: Negative for dry skin and rash.   Neurological: Negative for dizziness, speech difficulty, weakness, light-headedness, headache, memory problem and confusion.   Psychiatric/Behavioral: Negative for agitation, behavioral problems, decreased concentration, dysphoric mood, hallucinations, self-injury, sleep disturbance, suicidal ideas, negative for hyperactivity, depressed mood and stress. The patient is nervous/anxious.        Physical Exam;;  Physical Exam  Constitutional:       General: She is not in acute distress.     Appearance: She is well-developed. She is not diaphoretic.   HENT:      Head: Normocephalic and atraumatic.   Eyes:      Conjunctiva/sclera: Conjunctivae normal.   Pulmonary:      Effort: Pulmonary effort is normal. No respiratory distress.   Musculoskeletal:         General: Normal range of motion.      Cervical back: Full passive range of motion without pain and normal range of motion.   Neurological:      Mental Status: She is alert and oriented to person, place, and time.   Psychiatric:         Mood and Affect: Mood is anxious. Mood is not depressed. Affect is not labile, blunt, angry or inappropriate.         Speech: Speech is not rapid and pressured or tangential.         Behavior: Behavior normal. Behavior is not agitated, slowed, aggressive, withdrawn, hyperactive or combative. Behavior is cooperative.         Thought Content: Thought content normal. Thought content is not paranoid or delusional. Thought content does not include homicidal or suicidal ideation. Thought content does not include homicidal or suicidal plan.         Judgment: Judgment  normal.       There were no vitals taken for this visit.  There is no height or weight on file to calculate BMI. Video appt unable to obtain.  Current Medications;;    Current Outpatient Medications:   •  dexmethylphenidate (FOCALIN) 5 MG tablet, Take 5 mg orally daily every morning and every afternoon., Disp: 60 tablet, Rfl: 0  •  ibuprofen (ADVIL,MOTRIN) 800 MG tablet, Take 1 tablet by mouth Every 6 (Six) Hours As Needed for Moderate Pain., Disp: 60 tablet, Rfl: 3  •  levothyroxine (SYNTHROID, LEVOTHROID) 137 MCG tablet, TAKE ONE TABLET BY MOUTH DAILY, Disp: 30 tablet, Rfl: 0  •  rosuvastatin (Crestor) 10 MG tablet, Take 1 tablet by mouth Daily., Disp: 90 tablet, Rfl: 1    Lab Results:   Results Encounter on 03/09/2023   Component Date Value Ref Range Status   • Cologuard 04/11/2023 Negative  Negative Final    Comment:   NEGATIVE TEST RESULT. A negative Cologuard result indicates a low likelihood that a colorectal cancer (CRC) or advanced adenoma (adenomatous polyps with more advanced pre-malignant features)  is present. The chance that a person with a negative Cologuard test has a colorectal cancer is less than 1 in 1500 (negative predictive value >99.9%) or has an  advanced adenoma is less than  5.3% (negative predictive value 94.7%). These data are based on a prospective cross-sectional study of 10,000 individuals at average risk for colorectal cancer who were screened with both Cologuard and colonoscopy. (Farrah Omalley al, N Engl J Med 2014;370(14):1178-7678) The normal value (reference range) for this assay is negative.    COLOGUARD RE-SCREENING RECOMMENDATION: Periodic colorectal cancer screening is an important part of preventive healthcare for asymptomatic individuals at average risk for colorectal cancer.  Following a negative Cologuard result, the American Cancer Society and U.S.                            Multi-Society Task Force screening guidelines recommend a Cologuard re-screening interval of 3  years.   References: American Cancer Society Guideline for Colorectal Cancer Screening: https://www.cancer.org/cancer/colon-rectal-cancer/plfwwqypq-fjhssjazi-liunvap/acs-recommendations.html.; Jared SESAY, Janessa BIGGS, Ti OLSONK, Colorectal Cancer Screening: Recommendations for Physicians and Patients from the U.S. Multi-Society Task Force on Colorectal Cancer Screening , Am J Gastroenterology 2017; 112:6000-5986.    TEST DESCRIPTION: Composite algorithmic analysis of stool DNA-biomarkers with hemoglobin immunoassay.   Quantitative values of individual biomarkers are not reportable and are not associated with individual biomarker result reference ranges. Cologuard is intended for colorectal cancer screening of adults of either sex, 45 years or older, who are at average-risk for colorectal cancer (CRC). Cologuard has been approved for use by the U.S. FDA. The performance of Cologuard was                            established in a cross sectional study of average-risk adults aged 50-84. Cologuard performance in patients ages 45 to 49 years was estimated by sub-group analysis of near-age groups. Colonoscopies performed for a positive result may find as the most clinically significant lesion: colorectal cancer [4.0%], advanced adenoma (including sessile serrated polyps greater than or equal to 1cm diameter) [20%] or non- advanced adenoma [31%]; or no colorectal neoplasia [45%]. These estimates are derived from a prospective cross-sectional screening study of 10,000 individuals at average risk for colorectal cancer who were screened with both Cologuard and colonoscopy. (Farrah CRISTINA et al, N Engl J Med 2014;370(14):8541-3545.) Cologuard may produce a false negative or false positive result (no colorectal cancer or precancerous polyp present at colonoscopy follow up). A negative Cologuard test result does not guarantee the absence of CRC or advanced adenoma (pre-cancer). The current Cologuard                             screening interval is every 3 years. (American Cancer Society and U.S. Multi-Society Task Force). Cologuard performance data in a 10,000 patient pivotal study using colonoscopy as the reference method can be accessed at the following location: www.REHAPP/results. Additional description of the Cologuard test process, warnings and precautions can be found at www.kubo financierord.SeatMe.         Mental Status Exam:   Hygiene:   good  Cooperation:  Cooperative  Eye Contact:  Good  Psychomotor Behavior:  Appropriate  Mood: anxious  Affect:  Appropriate  Hopelessness: Denies  Speech:  Normal  Thought Process:  Goal directed  Thought Content:  Normal  Suicidal:  None  Homicidal:  None  Hallucinations:  None  Delusion:  None  Memory:  Intact  Orientation:  Person, Place, Time and Situation  Reliability:  good  Insight:  Good  Judgement:  Good  Impulse Control:  Good    PHQ-9 Depression Screening  Little interest or pleasure in doing things? (P) 2-->more than half the days   Feeling down, depressed, or hopeless? (P) 1-->several days   Trouble falling or staying asleep, or sleeping too much? (P) 1-->several days   Feeling tired or having little energy? (P) 1-->several days   Poor appetite or overeating? (P) 1-->several days   Feeling bad about yourself - or that you are a failure or have let yourself or your family down? (P) 1-->several days   Trouble concentrating on things, such as reading the newspaper or watching television? (P) 1-->several days   Moving or speaking so slowly that other people could have noticed? Or the opposite - being so fidgety or restless that you have been moving around a lot more than usual? (P) 3-->nearly every day   Thoughts that you would be better off dead, or of hurting yourself in some way? (P) 0-->not at all   PHQ-9 Total Score (P) 11   If you checked off any problems, how difficult have these problems made it for you to do your work, take care of things at home, or get along with other people?  (P) somewhat difficult      Reviewed [facility] # [id number]     Assessment/Plan:  Diagnoses and all orders for this visit:    1. Attention deficit hyperactivity disorder (ADHD), combined type (Primary)  -     dexmethylphenidate (FOCALIN) 5 MG tablet; Take 5 mg orally daily every morning and every afternoon.  Dispense: 60 tablet; Refill: 0      Some benefit from Ritalin however the side effects outweigh the benefit.  We will trial Focalin 5 mg twice a day.  We will follow-up in 3 weeks.    A psychological evaluation was conducted in order to assess past and current level of functioning. Areas assessed included, but were not limited to: perception of social support, perception of ability to face and deal with challenges in life (positive functioning), anxiety symptoms, depressive symptoms, perspective on beliefs/belief system, coping skills for stress, intelligence level,  Therapeutic rapport was established. Interventions conducted today were geared towards incorporating medication management along with support for continued therapy. Education was also provided as to the med management with this provider and what to expect in subsequent sessions.    We discussed risks, benefits,goals and side effects of the above medication and the patient was agreeable with the plan.Patient was educated on the importance of compliance with treatment and follow-up appointments. Patient is aware to contact the [facility] [phone] with any worsening of symptoms. To call for questions or concerns and return early if necessary. Patent is agreeable to go to the Emergency Department or call 911 should they begin SI/HI.     Part of this note may be an electronic transcription/translation of spoken language to printed text using the Dragon Dictation System.    Return in about 3 weeks (around 6/14/2023) for Follow Up 30 min, Video visit.    TYLER Vasquez

## 2023-05-31 ENCOUNTER — LAB (OUTPATIENT)
Dept: FAMILY MEDICINE CLINIC | Facility: CLINIC | Age: 63
End: 2023-05-31

## 2023-06-01 LAB
25(OH)D3+25(OH)D2 SERPL-MCNC: 30.1 NG/ML (ref 30–100)
ALBUMIN SERPL-MCNC: 4.6 G/DL (ref 3.8–4.8)
ALBUMIN/GLOB SERPL: 1.7 {RATIO} (ref 1.2–2.2)
ALP SERPL-CCNC: 115 IU/L (ref 44–121)
ALT SERPL-CCNC: 18 IU/L (ref 0–32)
AMBIG ABBREV CMP14 DEFAULT: NORMAL
AMBIG ABBREV LP DEFAULT: NORMAL
AST SERPL-CCNC: 23 IU/L (ref 0–40)
BASOPHILS # BLD AUTO: 0.1 X10E3/UL (ref 0–0.2)
BASOPHILS NFR BLD AUTO: 1 %
BILIRUB SERPL-MCNC: 0.4 MG/DL (ref 0–1.2)
BUN SERPL-MCNC: 13 MG/DL (ref 8–27)
BUN/CREAT SERPL: 16 (ref 12–28)
CALCIUM SERPL-MCNC: 9.5 MG/DL (ref 8.7–10.3)
CHLORIDE SERPL-SCNC: 97 MMOL/L (ref 96–106)
CHOLEST SERPL-MCNC: 199 MG/DL (ref 100–199)
CO2 SERPL-SCNC: 25 MMOL/L (ref 20–29)
CREAT SERPL-MCNC: 0.83 MG/DL (ref 0.57–1)
EGFRCR SERPLBLD CKD-EPI 2021: 79 ML/MIN/1.73
EOSINOPHIL # BLD AUTO: 0.2 X10E3/UL (ref 0–0.4)
EOSINOPHIL NFR BLD AUTO: 3 %
ERYTHROCYTE [DISTWIDTH] IN BLOOD BY AUTOMATED COUNT: 14.1 % (ref 11.7–15.4)
GLOBULIN SER CALC-MCNC: 2.7 G/DL (ref 1.5–4.5)
GLUCOSE SERPL-MCNC: 90 MG/DL (ref 70–99)
HBA1C MFR BLD: 5.5 % (ref 4.8–5.6)
HCT VFR BLD AUTO: 44.3 % (ref 34–46.6)
HCV IGG SERPL QL IA: NON REACTIVE
HDLC SERPL-MCNC: 53 MG/DL
HGB BLD-MCNC: 15.1 G/DL (ref 11.1–15.9)
IMM GRANULOCYTES # BLD AUTO: 0 X10E3/UL (ref 0–0.1)
IMM GRANULOCYTES NFR BLD AUTO: 0 %
LDLC SERPL CALC-MCNC: 108 MG/DL (ref 0–99)
LYMPHOCYTES # BLD AUTO: 1.8 X10E3/UL (ref 0.7–3.1)
LYMPHOCYTES NFR BLD AUTO: 26 %
MCH RBC QN AUTO: 32.5 PG (ref 26.6–33)
MCHC RBC AUTO-ENTMCNC: 34.1 G/DL (ref 31.5–35.7)
MCV RBC AUTO: 95 FL (ref 79–97)
MONOCYTES # BLD AUTO: 0.8 X10E3/UL (ref 0.1–0.9)
MONOCYTES NFR BLD AUTO: 11 %
NEUTROPHILS # BLD AUTO: 4.3 X10E3/UL (ref 1.4–7)
NEUTROPHILS NFR BLD AUTO: 59 %
PLATELET # BLD AUTO: 240 X10E3/UL (ref 150–450)
POTASSIUM SERPL-SCNC: 4.3 MMOL/L (ref 3.5–5.2)
PROT SERPL-MCNC: 7.3 G/DL (ref 6–8.5)
RBC # BLD AUTO: 4.65 X10E6/UL (ref 3.77–5.28)
SODIUM SERPL-SCNC: 137 MMOL/L (ref 134–144)
T4 FREE SERPL-MCNC: 1.25 NG/DL (ref 0.82–1.77)
TRIGL SERPL-MCNC: 223 MG/DL (ref 0–149)
TSH SERPL DL<=0.005 MIU/L-ACNC: 2.17 UIU/ML (ref 0.45–4.5)
VLDLC SERPL CALC-MCNC: 38 MG/DL (ref 5–40)
WBC # BLD AUTO: 7.2 X10E3/UL (ref 3.4–10.8)

## 2023-06-05 RX ORDER — LEVOTHYROXINE SODIUM 137 UG/1
TABLET ORAL
Qty: 90 TABLET | Refills: 0 | Status: SHIPPED | OUTPATIENT
Start: 2023-06-05

## 2023-06-14 ENCOUNTER — TELEMEDICINE (OUTPATIENT)
Dept: PSYCHIATRY | Facility: CLINIC | Age: 63
End: 2023-06-14
Payer: MEDICARE

## 2023-06-14 DIAGNOSIS — F90.2 ATTENTION DEFICIT HYPERACTIVITY DISORDER (ADHD), COMBINED TYPE: Primary | ICD-10-CM

## 2023-06-14 DIAGNOSIS — Z79.899 OTHER LONG TERM (CURRENT) DRUG THERAPY: ICD-10-CM

## 2023-06-14 PROCEDURE — 99214 OFFICE O/P EST MOD 30 MIN: CPT | Performed by: NURSE PRACTITIONER

## 2023-06-14 PROCEDURE — 1159F MED LIST DOCD IN RCRD: CPT | Performed by: NURSE PRACTITIONER

## 2023-06-14 PROCEDURE — 1160F RVW MEDS BY RX/DR IN RCRD: CPT | Performed by: NURSE PRACTITIONER

## 2023-06-14 RX ORDER — DEXTROAMPHETAMINE SACCHARATE, AMPHETAMINE ASPARTATE, DEXTROAMPHETAMINE SULFATE AND AMPHETAMINE SULFATE 2.5; 2.5; 2.5; 2.5 MG/1; MG/1; MG/1; MG/1
TABLET ORAL
Qty: 60 TABLET | Refills: 0 | Status: SHIPPED | OUTPATIENT
Start: 2023-06-14

## 2023-06-14 NOTE — PROGRESS NOTES
Patient Name: Celestina Sullivan  MRN: [id number]   :  1960     This provider is located at her home office through the [facility] (through [facility]), [address], [address] using a secure [employer] Video Visit through [employer]. Patient is being seen remotely via telehealth at their home address in [place], and stated they are in a secure environment for this session. The patient's condition being diagnosed/treated is appropriate for telemedicine. The provider identified herself as well as her credentials.   The patient, and/or patients guardian, consent to be seen remotely, and when consent is given they understand that the consent allows for patient identifiable information to be sent to a third party as needed.   They may refuse to be seen remotely at any time. The electronic data is encrypted and password protected, and the patient and/or guardian has been advised of the potential risks to privacy not withstanding such measures.    You have chosen to receive care through a telehealth visit.  Do you consent to use a video/audio connection for your medical care today? Yes    Chief Complaint:      ICD-10-CM ICD-9-CM   1. Attention deficit hyperactivity disorder (ADHD), combined type  F90.2 314.01   2. Other long term (current) drug therapy  Z79.899 V58.69       History of Present Illness: Celestina Sullivan is a 63 y.o. female is here today for medication management follow up.  Patient states the Focalin has helped her focus however still causing an increase in heart rate.  Patient denies being depressed however states she just does not care.  Does not want to get out and do things.  Patient states she is smoking more and drinking more.  Again states phentermine did a great job.  Does not have energy.    The following portions of the patient's history were reviewed and updated as appropriate: allergies, current medications, past family history, past medical  history, past social history, past surgical history, and problem list.    Review of Systems;;  Review of Systems   Constitutional:  Negative for activity change, appetite change, fatigue, unexpected weight gain and unexpected weight loss.   Respiratory:  Negative for shortness of breath and wheezing.    Gastrointestinal:  Negative for constipation, diarrhea, nausea and vomiting.   Musculoskeletal:  Negative for gait problem.   Skin:  Negative for dry skin and rash.   Neurological:  Negative for dizziness, speech difficulty, weakness, light-headedness, headache, memory problem and confusion.   Psychiatric/Behavioral:  Negative for agitation, behavioral problems, decreased concentration, dysphoric mood, hallucinations, self-injury, sleep disturbance, suicidal ideas, negative for hyperactivity, depressed mood and stress. The patient is not nervous/anxious.      Physical Exam;;  Physical Exam  Constitutional:       General: She is not in acute distress.     Appearance: She is well-developed. She is not diaphoretic.   HENT:      Head: Normocephalic and atraumatic.   Eyes:      Conjunctiva/sclera: Conjunctivae normal.   Pulmonary:      Effort: Pulmonary effort is normal. No respiratory distress.   Musculoskeletal:         General: Normal range of motion.      Cervical back: Full passive range of motion without pain and normal range of motion.   Neurological:      Mental Status: She is alert and oriented to person, place, and time.   Psychiatric:         Mood and Affect: Mood is not anxious or depressed. Affect is not labile, blunt, angry or inappropriate.         Speech: Speech is not rapid and pressured or tangential.         Behavior: Behavior normal. Behavior is not agitated, slowed, aggressive, withdrawn, hyperactive or combative. Behavior is cooperative.         Thought Content: Thought content normal. Thought content is not paranoid or delusional. Thought content does not include homicidal or suicidal ideation.  Thought content does not include homicidal or suicidal plan.         Judgment: Judgment normal.     There were no vitals taken for this visit.  There is no height or weight on file to calculate BMI. Video appt unable to obtain.     Current Medications;;    Current Outpatient Medications:     amphetamine-dextroamphetamine (Adderall) 10 MG tablet, Take 10 mg orally every morning and afternoon., Disp: 60 tablet, Rfl: 0    ibuprofen (ADVIL,MOTRIN) 800 MG tablet, Take 1 tablet by mouth Every 6 (Six) Hours As Needed for Moderate Pain., Disp: 60 tablet, Rfl: 3    levothyroxine (SYNTHROID, LEVOTHROID) 137 MCG tablet, TAKE 1 TABLET BY MOUTH DAILY, Disp: 90 tablet, Rfl: 0    rosuvastatin (Crestor) 10 MG tablet, Take 1 tablet by mouth Daily., Disp: 90 tablet, Rfl: 1    Lab Results:   Orders Only on 05/31/2023   Component Date Value Ref Range Status    WBC 05/31/2023 7.2  3.4 - 10.8 x10E3/uL Final    RBC 05/31/2023 4.65  3.77 - 5.28 x10E6/uL Final    Hemoglobin 05/31/2023 15.1  11.1 - 15.9 g/dL Final    Hematocrit 05/31/2023 44.3  34.0 - 46.6 % Final    MCV 05/31/2023 95  79 - 97 fL Final    MCH 05/31/2023 32.5  26.6 - 33.0 pg Final    MCHC 05/31/2023 34.1  31.5 - 35.7 g/dL Final    RDW 05/31/2023 14.1  11.7 - 15.4 % Final    Platelets 05/31/2023 240  150 - 450 x10E3/uL Final    Neutrophil Rel % 05/31/2023 59  Not Estab. % Final    Lymphocyte Rel % 05/31/2023 26  Not Estab. % Final    Monocyte Rel % 05/31/2023 11  Not Estab. % Final    Eosinophil Rel % 05/31/2023 3  Not Estab. % Final    Basophil Rel % 05/31/2023 1  Not Estab. % Final    Neutrophils Absolute 05/31/2023 4.3  1.4 - 7.0 x10E3/uL Final    Lymphocytes Absolute 05/31/2023 1.8  0.7 - 3.1 x10E3/uL Final    Monocytes Absolute 05/31/2023 0.8  0.1 - 0.9 x10E3/uL Final    Eosinophils Absolute 05/31/2023 0.2  0.0 - 0.4 x10E3/uL Final    Basophils Absolute 05/31/2023 0.1  0.0 - 0.2 x10E3/uL Final    Immature Granulocyte Rel % 05/31/2023 0  Not Estab. % Final    Immature  Grans Absolute 05/31/2023 0.0  0.0 - 0.1 x10E3/uL Final    Glucose 05/31/2023 90  70 - 99 mg/dL Final    BUN 05/31/2023 13  8 - 27 mg/dL Final    Creatinine 05/31/2023 0.83  0.57 - 1.00 mg/dL Final    EGFR Result 05/31/2023 79  >59 mL/min/1.73 Final    BUN/Creatinine Ratio 05/31/2023 16  12 - 28 Final    Sodium 05/31/2023 137  134 - 144 mmol/L Final    Potassium 05/31/2023 4.3  3.5 - 5.2 mmol/L Final    Chloride 05/31/2023 97  96 - 106 mmol/L Final    Total CO2 05/31/2023 25  20 - 29 mmol/L Final    Calcium 05/31/2023 9.5  8.7 - 10.3 mg/dL Final    Total Protein 05/31/2023 7.3  6.0 - 8.5 g/dL Final    Albumin 05/31/2023 4.6  3.8 - 4.8 g/dL Final    Globulin 05/31/2023 2.7  1.5 - 4.5 g/dL Final    A/G Ratio 05/31/2023 1.7  1.2 - 2.2 Final    Total Bilirubin 05/31/2023 0.4  0.0 - 1.2 mg/dL Final    Alkaline Phosphatase 05/31/2023 115  44 - 121 IU/L Final    AST (SGOT) 05/31/2023 23  0 - 40 IU/L Final    ALT (SGPT) 05/31/2023 18  0 - 32 IU/L Final    Total Cholesterol 05/31/2023 199  100 - 199 mg/dL Final    Triglycerides 05/31/2023 223 (H)  0 - 149 mg/dL Final    HDL Cholesterol 05/31/2023 53  >39 mg/dL Final    VLDL Cholesterol Doug 05/31/2023 38  5 - 40 mg/dL Final    LDL Chol Calc (NIH) 05/31/2023 108 (H)  0 - 99 mg/dL Final    Hemoglobin A1C 05/31/2023 5.5  4.8 - 5.6 % Final    Comment:          Prediabetes: 5.7 - 6.4           Diabetes: >6.4           Glycemic control for adults with diabetes: <7.0      Free T4 05/31/2023 1.25  0.82 - 1.77 ng/dL Final    TSH 05/31/2023 2.170  0.450 - 4.500 uIU/mL Final    25 Hydroxy, Vitamin D 05/31/2023 30.1  30.0 - 100.0 ng/mL Final    Comment: Vitamin D deficiency has been defined by the [place] of  Medicine and an Endocrine Society practice guideline as a  level of serum 25-OH vitamin D less than 20 ng/mL (1,2).  The Endocrine Society went on to further define vitamin D  insufficiency as a level between 21 and 29 ng/mL (2).  1. IOM ([place] of Medicine). 2010. Dietary  reference     intakes for calcium and D. [place]: The     National Academies Press.  2. Tru MF, Chaya NC, Duane GORDILLO, et al.     Evaluation, treatment, and prevention of vitamin D     deficiency: an Endocrine Society clinical practice     guideline. JCEM. 2011 Jul; 96(7):1911-30.      Hep C Virus Ab 05/31/2023 Non Reactive  Non Reactive Final    Comment: HCV antibody alone does not differentiate between previously  resolved infection and active infection. Equivocal and Reactive  HCV antibody results should be followed up with an HCV RNA test  to support the diagnosis of active HCV infection.      Miriam Rivera CMP14 Default 05/31/2023 Comment   Final    Comment: A hand-written panel/profile was received from your office. In  accordance with the [facility] Ambiguous Test Code Policy dated July 2003, we have completed your order by using the closest currently  or formerly recognized AMA panel.  We have assigned Comprehensive  Metabolic Panel (14), Test Code #883465 to this request.  If this  is not the testing you wished to receive on this specimen, please  contact the [facility] Client Inquiry/Technical Services Department  to clarify the test order.  We appreciate your business.      Miriam Rivera LP Default 05/31/2023 Comment   Final    Comment: A hand-written panel/profile was received from your office. In  accordance with the [facility] Ambiguous Test Code Policy dated July 2003, we have completed your order by using the closest currently  or formerly recognized AMA panel.  We have assigned Lipid Panel,  Test Code #667709 to this request. If this is not the testing you  wished to receive on this specimen, please contact the [facility]  Client Inquiry/Technical Services Department to clarify the test  order.  We appreciate your business.         Mental Status Exam:   Hygiene:   good  Cooperation:  Cooperative  Eye Contact:  Good  Psychomotor Behavior:  Appropriate  Mood:depressed  Affect:   Appropriate  Hopelessness: Denies  Speech:  Normal  Thought Process:  Goal directed  Thought Content:  Normal  Suicidal:   feels better off not here however no self harm. No plan or intent.   Homicidal:  None  Hallucinations:  None  Delusion:  None  Memory:  Intact  Orientation:  Person, Place, Time, and Situation  Reliability:  good  Insight:  Good  Judgement:  Good  Impulse Control:  Good    PHQ-9 Depression Screening  Little interest or pleasure in doing things? (P) 2-->more than half the days   Feeling down, depressed, or hopeless? (P) 2-->more than half the days   Trouble falling or staying asleep, or sleeping too much? (P) 2-->more than half the days   Feeling tired or having little energy? (P) 2-->more than half the days   Poor appetite or overeating? (P) 2-->more than half the days   Feeling bad about yourself - or that you are a failure or have let yourself or your family down? (P) 2-->more than half the days   Trouble concentrating on things, such as reading the newspaper or watching television? (P) 2-->more than half the days   Moving or speaking so slowly that other people could have noticed? Or the opposite - being so fidgety or restless that you have been moving around a lot more than usual? (P) 2-->more than half the days   Thoughts that you would be better off dead, or of hurting yourself in some way? (P) 2-->more than half the days   PHQ-9 Total Score (P) 18   If you checked off any problems, how difficult have these problems made it for you to do your work, take care of things at home, or get along with other people? (P) somewhat difficult        Assessment/Plan:  Diagnoses and all orders for this visit:    1. Attention deficit hyperactivity disorder (ADHD), combined type (Primary)  -     amphetamine-dextroamphetamine (Adderall) 10 MG tablet; Take 10 mg orally every morning and afternoon.  Dispense: 60 tablet; Refill: 0    2. Other long term (current) drug therapy      Based on patient's PHQ-9  patient is obviously depressed however denies this.  Offered an antidepressant.  Patient declined.  States she took Adderall years ago and wonders about trying this instead.    A psychological evaluation was conducted in order to assess past and current level of functioning. Areas assessed included, but were not limited to: perception of social support, perception of ability to face and deal with challenges in life (positive functioning), anxiety symptoms, depressive symptoms, perspective on beliefs/belief system, coping skills for stress, intelligence level,  Therapeutic rapport was established. Interventions conducted today were geared towards incorporating medication management along with support for continued therapy. Education was also provided as to the med management with this provider and what to expect in subsequent sessions.    We discussed risks, benefits,goals and side effects of the above medication and the patient was agreeable with the plan.Patient was educated on the importance of compliance with treatment and follow-up appointments. Patient is aware to contact the [facility] [phone] with any worsening of symptoms. To call for questions or concerns and return early if necessary. Patent is agreeable to go to the Emergency Department or call 911 should they begin SI/HI.     Part of this note may be an electronic transcription/translation of spoken language to printed text using the Dragon Dictation System.    Return in about 2 weeks (around 6/28/2023) for Follow Up 30 min, Video visit.    TYLER Vasquez

## 2023-07-31 ENCOUNTER — TELEMEDICINE (OUTPATIENT)
Dept: PSYCHIATRY | Facility: CLINIC | Age: 63
End: 2023-07-31
Payer: MEDICARE

## 2023-07-31 DIAGNOSIS — F34.1 DYSTHYMIA: ICD-10-CM

## 2023-07-31 DIAGNOSIS — F90.2 ATTENTION DEFICIT HYPERACTIVITY DISORDER (ADHD), COMBINED TYPE: Primary | ICD-10-CM

## 2023-07-31 PROCEDURE — 1159F MED LIST DOCD IN RCRD: CPT | Performed by: NURSE PRACTITIONER

## 2023-07-31 PROCEDURE — 99214 OFFICE O/P EST MOD 30 MIN: CPT | Performed by: NURSE PRACTITIONER

## 2023-07-31 PROCEDURE — 1160F RVW MEDS BY RX/DR IN RCRD: CPT | Performed by: NURSE PRACTITIONER

## 2023-07-31 RX ORDER — DEXTROAMPHETAMINE SACCHARATE, AMPHETAMINE ASPARTATE, DEXTROAMPHETAMINE SULFATE AND AMPHETAMINE SULFATE 5; 5; 5; 5 MG/1; MG/1; MG/1; MG/1
TABLET ORAL
Qty: 60 TABLET | Refills: 0 | Status: SHIPPED | OUTPATIENT
Start: 2023-07-31

## 2023-09-05 RX ORDER — ROSUVASTATIN CALCIUM 10 MG/1
TABLET, COATED ORAL
Qty: 90 TABLET | Refills: 1 | Status: SHIPPED | OUTPATIENT
Start: 2023-09-05

## 2023-09-06 ENCOUNTER — TELEMEDICINE (OUTPATIENT)
Dept: PSYCHIATRY | Facility: CLINIC | Age: 63
End: 2023-09-06
Payer: MEDICARE

## 2023-09-06 DIAGNOSIS — F90.2 ATTENTION DEFICIT HYPERACTIVITY DISORDER (ADHD), COMBINED TYPE: Primary | ICD-10-CM

## 2023-09-06 DIAGNOSIS — F34.1 DYSTHYMIA: ICD-10-CM

## 2023-09-06 PROCEDURE — 1159F MED LIST DOCD IN RCRD: CPT | Performed by: NURSE PRACTITIONER

## 2023-09-06 PROCEDURE — 99214 OFFICE O/P EST MOD 30 MIN: CPT | Performed by: NURSE PRACTITIONER

## 2023-09-06 PROCEDURE — 1160F RVW MEDS BY RX/DR IN RCRD: CPT | Performed by: NURSE PRACTITIONER

## 2023-09-06 RX ORDER — DEXTROAMPHETAMINE SACCHARATE, AMPHETAMINE ASPARTATE, DEXTROAMPHETAMINE SULFATE AND AMPHETAMINE SULFATE 5; 5; 5; 5 MG/1; MG/1; MG/1; MG/1
TABLET ORAL
Qty: 60 TABLET | Refills: 0 | Status: SHIPPED | OUTPATIENT
Start: 2023-09-06

## 2023-09-06 NOTE — PROGRESS NOTES
Patient Name: Celestina Sullivan  MRN: [id number]   :  1960     This provider is located at her home office through the [facility] (through [facility]), [address], [address] using a secure [employer] Video Visit through [employer]. Patient is being seen remotely via telehealth at their home address in [place], and stated they are in a secure environment for this session. The patient's condition being diagnosed/treated is appropriate for telemedicine. The provider identified herself as well as her credentials.   The patient, and/or patients guardian, consent to be seen remotely, and when consent is given they understand that the consent allows for patient identifiable information to be sent to a third party as needed.   They may refuse to be seen remotely at any time. The electronic data is encrypted and password protected, and the patient and/or guardian has been advised of the potential risks to privacy not withstanding such measures.    You have chosen to receive care through a telehealth visit.  Do you consent to use a video/audio connection for your medical care today? Yes    Chief Complaint:      ICD-10-CM ICD-9-CM   1. Attention deficit hyperactivity disorder (ADHD), combined type  F90.2 314.01   2. Dysthymia  F34.1 300.4       History of Present Illness: Celestina Sullivan is a 63 y.o. female is here today for medication management follow up.  Patient feels Adderall is working well for focus and task completion.  States it also gives her energy and is helping her depression.  Patient states she quit her job as they were giving her more hours than she agreed to.  Not concerned about finances as this was a way to socialize and be around other people.  Patient recently had kidney stones, UTI, and strep throat.  Patient feels better now.  Misses her youngest children and grandchildren.  Plans to go visit them in October.  May potentially consider moving there in  the future.    The following portions of the patient's history were reviewed and updated as appropriate: allergies, current medications, past family history, past medical history, past social history, past surgical history, and problem list.    Review of Systems;;  Review of Systems   Constitutional:  Negative for activity change, appetite change, fatigue, unexpected weight gain and unexpected weight loss.   Respiratory:  Negative for shortness of breath and wheezing.    Gastrointestinal:  Negative for constipation, diarrhea, nausea and vomiting.   Musculoskeletal:  Negative for gait problem.   Skin:  Negative for dry skin and rash.   Neurological:  Negative for dizziness, speech difficulty, weakness, light-headedness, headache, memory problem and confusion.   Psychiatric/Behavioral:  Positive for stress. Negative for agitation, behavioral problems, decreased concentration, dysphoric mood, hallucinations, self-injury, sleep disturbance, suicidal ideas, negative for hyperactivity and depressed mood. The patient is not nervous/anxious.      Physical Exam;;  Physical Exam  Constitutional:       General: She is not in acute distress.     Appearance: She is well-developed. She is not diaphoretic.   HENT:      Head: Normocephalic and atraumatic.   Eyes:      Conjunctiva/sclera: Conjunctivae normal.   Pulmonary:      Effort: Pulmonary effort is normal. No respiratory distress.   Musculoskeletal:         General: Normal range of motion.      Cervical back: Full passive range of motion without pain and normal range of motion.   Neurological:      Mental Status: She is alert and oriented to person, place, and time.   Psychiatric:         Mood and Affect: Mood is not anxious or depressed. Affect is not labile, blunt, angry or inappropriate.         Speech: Speech is not rapid and pressured or tangential.         Behavior: Behavior normal. Behavior is not agitated, slowed, aggressive, withdrawn, hyperactive or combative.  Behavior is cooperative.         Thought Content: Thought content normal. Thought content is not paranoid or delusional. Thought content does not include homicidal or suicidal ideation. Thought content does not include homicidal or suicidal plan.         Judgment: Judgment normal.     There were no vitals taken for this visit.  There is no height or weight on file to calculate BMI. Video appt unable to obtain.     Current Medications;;    Current Outpatient Medications:     amphetamine-dextroamphetamine (Adderall) 20 MG tablet, Take 20 mg orally every morning and afternoon., Disp: 60 tablet, Rfl: 0    ibuprofen (ADVIL,MOTRIN) 800 MG tablet, Take 1 tablet by mouth Every 6 (Six) Hours As Needed for Moderate Pain., Disp: 60 tablet, Rfl: 3    levothyroxine (SYNTHROID, LEVOTHROID) 137 MCG tablet, TAKE 1 TABLET BY MOUTH DAILY, Disp: 90 tablet, Rfl: 0    rosuvastatin (CRESTOR) 10 MG tablet, TAKE ONE TABLET BY MOUTH DAILY, Disp: 90 tablet, Rfl: 1    Lab Results:   No visits with results within 3 Month(s) from this visit.   Latest known visit with results is:   Orders Only on 05/31/2023   Component Date Value Ref Range Status    WBC 05/31/2023 7.2  3.4 - 10.8 x10E3/uL Final    RBC 05/31/2023 4.65  3.77 - 5.28 x10E6/uL Final    Hemoglobin 05/31/2023 15.1  11.1 - 15.9 g/dL Final    Hematocrit 05/31/2023 44.3  34.0 - 46.6 % Final    MCV 05/31/2023 95  79 - 97 fL Final    MCH 05/31/2023 32.5  26.6 - 33.0 pg Final    MCHC 05/31/2023 34.1  31.5 - 35.7 g/dL Final    RDW 05/31/2023 14.1  11.7 - 15.4 % Final    Platelets 05/31/2023 240  150 - 450 x10E3/uL Final    Neutrophil Rel % 05/31/2023 59  Not Estab. % Final    Lymphocyte Rel % 05/31/2023 26  Not Estab. % Final    Monocyte Rel % 05/31/2023 11  Not Estab. % Final    Eosinophil Rel % 05/31/2023 3  Not Estab. % Final    Basophil Rel % 05/31/2023 1  Not Estab. % Final    Neutrophils Absolute 05/31/2023 4.3  1.4 - 7.0 x10E3/uL Final    Lymphocytes Absolute 05/31/2023 1.8  0.7 - 3.1  x10E3/uL Final    Monocytes Absolute 05/31/2023 0.8  0.1 - 0.9 x10E3/uL Final    Eosinophils Absolute 05/31/2023 0.2  0.0 - 0.4 x10E3/uL Final    Basophils Absolute 05/31/2023 0.1  0.0 - 0.2 x10E3/uL Final    Immature Granulocyte Rel % 05/31/2023 0  Not Estab. % Final    Immature Grans Absolute 05/31/2023 0.0  0.0 - 0.1 x10E3/uL Final    Glucose 05/31/2023 90  70 - 99 mg/dL Final    BUN 05/31/2023 13  8 - 27 mg/dL Final    Creatinine 05/31/2023 0.83  0.57 - 1.00 mg/dL Final    EGFR Result 05/31/2023 79  >59 mL/min/1.73 Final    BUN/Creatinine Ratio 05/31/2023 16  12 - 28 Final    Sodium 05/31/2023 137  134 - 144 mmol/L Final    Potassium 05/31/2023 4.3  3.5 - 5.2 mmol/L Final    Chloride 05/31/2023 97  96 - 106 mmol/L Final    Total CO2 05/31/2023 25  20 - 29 mmol/L Final    Calcium 05/31/2023 9.5  8.7 - 10.3 mg/dL Final    Total Protein 05/31/2023 7.3  6.0 - 8.5 g/dL Final    Albumin 05/31/2023 4.6  3.8 - 4.8 g/dL Final    Globulin 05/31/2023 2.7  1.5 - 4.5 g/dL Final    A/G Ratio 05/31/2023 1.7  1.2 - 2.2 Final    Total Bilirubin 05/31/2023 0.4  0.0 - 1.2 mg/dL Final    Alkaline Phosphatase 05/31/2023 115  44 - 121 IU/L Final    AST (SGOT) 05/31/2023 23  0 - 40 IU/L Final    ALT (SGPT) 05/31/2023 18  0 - 32 IU/L Final    Total Cholesterol 05/31/2023 199  100 - 199 mg/dL Final    Triglycerides 05/31/2023 223 (H)  0 - 149 mg/dL Final    HDL Cholesterol 05/31/2023 53  >39 mg/dL Final    VLDL Cholesterol Doug 05/31/2023 38  5 - 40 mg/dL Final    LDL Chol Calc (NIH) 05/31/2023 108 (H)  0 - 99 mg/dL Final    Hemoglobin A1C 05/31/2023 5.5  4.8 - 5.6 % Final    Comment:          Prediabetes: 5.7 - 6.4           Diabetes: >6.4           Glycemic control for adults with diabetes: <7.0      Free T4 05/31/2023 1.25  0.82 - 1.77 ng/dL Final    TSH 05/31/2023 2.170  0.450 - 4.500 uIU/mL Final    25 Hydroxy, Vitamin D 05/31/2023 30.1  30.0 - 100.0 ng/mL Final    Comment: Vitamin D deficiency has been defined by the [place]  of  Medicine and an Endocrine Society practice guideline as a  level of serum 25-OH vitamin D less than 20 ng/mL (1,2).  The Endocrine Society went on to further define vitamin D  insufficiency as a level between 21 and 29 ng/mL (2).  1. IOM ([place] of Medicine). 2010. Dietary reference     intakes for calcium and D. [place]: The     National Academ[employer] Positioning [employer] Press.  2. Tru MF, Chaya NC, Duane GORDILLO, et al.     Evaluation, treatment, and prevention of vitamin D     deficiency: an Endocrine Society clinical practice     guideline. JCEM. 2011 Jul; 96(6):1911-30.      Hep C Virus Ab 05/31/2023 Non Reactive  Non Reactive Final    Comment: HCV antibody alone does not differentiate between previously  resolved infection and active infection. Equivocal and Reactive  HCV antibody results should be followed up with an HCV RNA test  to support the diagnosis of active HCV infection.      Miriam Rivera CMP14 Default 05/31/2023 Comment   Final    Comment: A hand-written panel/profile was received from your office. In  accordance with the [facility] Ambiguous Test Code Policy dated July 2003, we have completed your order by using the closest currently  or formerly recognized AMA panel.  We have assigned Comprehensive  Metabolic Panel (14), Test Code #[id number] to this request.  If this  is not the testing you wished to receive on this specimen, please  contact the [facility] Client Inquiry/Technical Services Department  to clarify the test order.  We appreciate your business.      Miriam Rivera LP Default 05/31/2023 Comment   Final    Comment: A hand-written panel/profile was received from your office. In  accordance with the [facility] Ambiguous Test Code Policy dated July 2003, we have completed your order by using the closest currently  or formerly recognized AMA panel.  We have assigned Lipid Panel,  Test Code #[id number] to this request. If this is not the testing you  wished to receive on this specimen, please contact the  [facility]  Client Inquiry/Technical Services Department to clarify the test  order.  We appreciate your business.         Mental Status Exam:   Hygiene:   good  Cooperation:  Cooperative  Eye Contact:  Good  Psychomotor Behavior:  Appropriate  Mood:within normal limits  Affect:  Appropriate  Hopelessness: Denies  Speech:  Normal  Thought Process:  Goal directed  Thought Content:  Normal  Suicidal:  None  Homicidal:  None  Hallucinations:  None  Delusion:  None  Memory:  Intact  Orientation:  Person, Place, Time, and Situation  Reliability:  good  Insight:  Good  Judgement:  Good  Impulse Control:  Good    PHQ-9 Depression Screening  Little interest or pleasure in doing things?     Feeling down, depressed, or hopeless?     Trouble falling or staying asleep, or sleeping too much?     Feeling tired or having little energy?     Poor appetite or overeating?     Feeling bad about yourself - or that you are a failure or have let yourself or your family down?     Trouble concentrating on things, such as reading the newspaper or watching television?     Moving or speaking so slowly that other people could have noticed? Or the opposite - being so fidgety or restless that you have been moving around a lot more than usual?     Thoughts that you would be better off dead, or of hurting yourself in some way?     PHQ-9 Total Score     If you checked off any problems, how difficult have these problems made it for you to do your work, take care of things at home, or get along with other people?          Assessment/Plan:  Diagnoses and all orders for this visit:    1. Attention deficit hyperactivity disorder (ADHD), combined type (Primary)  -     amphetamine-dextroamphetamine (Adderall) 20 MG tablet; Take 20 mg orally every morning and afternoon.  Dispense: 60 tablet; Refill: 0    2. Dysthymia      Patient states Adderall is helping focus and task completion as well as her mood.  We will continue as ordered and follow-up in 2 months  after her trip to see her children and grandchildren.    A psychological evaluation was conducted in order to assess past and current level of functioning. Areas assessed included, but were not limited to: perception of social support, perception of ability to face and deal with challenges in life (positive functioning), anxiety symptoms, depressive symptoms, perspective on beliefs/belief system, coping skills for stress, intelligence level,  Therapeutic rapport was established. Interventions conducted today were geared towards incorporating medication management along with support for continued therapy. Education was also provided as to the med management with this provider and what to expect in subsequent sessions.    We discussed risks, benefits,goals and side effects of the above medication and the patient was agreeable with the plan.Patient was educated on the importance of compliance with treatment and follow-up appointments. Patient is aware to contact the [facility] [phone] with any worsening of symptoms. To call for questions or concerns and return early if necessary. Patent is agreeable to go to the Emergency Department or call 911 should they begin SI/HI.     Part of this note may be an electronic transcription/translation of spoken language to printed text using the Dragon Dictation System.    Return in about 2 months (around 11/6/2023) for Follow Up 30 min, Video visit.    TYLER Vasquez

## 2023-09-11 RX ORDER — LEVOTHYROXINE SODIUM 137 UG/1
TABLET ORAL
Qty: 90 TABLET | Refills: 0 | Status: SHIPPED | OUTPATIENT
Start: 2023-09-11

## 2023-10-10 DIAGNOSIS — F90.2 ATTENTION DEFICIT HYPERACTIVITY DISORDER (ADHD), COMBINED TYPE: ICD-10-CM

## 2023-10-10 RX ORDER — DEXTROAMPHETAMINE SACCHARATE, AMPHETAMINE ASPARTATE, DEXTROAMPHETAMINE SULFATE AND AMPHETAMINE SULFATE 5; 5; 5; 5 MG/1; MG/1; MG/1; MG/1
TABLET ORAL
Qty: 60 TABLET | Refills: 0 | Status: SHIPPED | OUTPATIENT
Start: 2023-10-10

## 2023-11-14 ENCOUNTER — CLINICAL SUPPORT (OUTPATIENT)
Dept: FAMILY MEDICINE CLINIC | Facility: CLINIC | Age: 63
End: 2023-11-14
Payer: MEDICARE

## 2023-11-14 DIAGNOSIS — F90.2 ATTENTION DEFICIT HYPERACTIVITY DISORDER (ADHD), COMBINED TYPE: ICD-10-CM

## 2023-11-14 DIAGNOSIS — Z79.899 ENCOUNTER FOR LONG-TERM (CURRENT) USE OF OTHER MEDICATIONS: ICD-10-CM

## 2023-11-14 DIAGNOSIS — F90.2 ATTENTION DEFICIT HYPERACTIVITY DISORDER (ADHD), COMBINED TYPE: Primary | ICD-10-CM

## 2023-11-14 LAB
AMPHET+METHAMPHET UR QL: POSITIVE
AMPHETAMINE INTERNAL CONTROL: ABNORMAL
AMPHETAMINES UR QL: NEGATIVE
BARBITURATE INTERNAL CONTROL: ABNORMAL
BARBITURATES UR QL SCN: NEGATIVE
BENZODIAZ UR QL SCN: NEGATIVE
BENZODIAZEPINE INTERNAL CONTROL: ABNORMAL
BUPRENORPHINE INTERNAL CONTROL: ABNORMAL
BUPRENORPHINE SERPL-MCNC: NEGATIVE NG/ML
CANNABINOIDS SERPL QL: NEGATIVE
COCAINE INTERNAL CONTROL: ABNORMAL
COCAINE UR QL: NEGATIVE
EXPIRATION DATE: ABNORMAL
Lab: ABNORMAL
MDMA (ECSTASY) INTERNAL CONTROL: ABNORMAL
MDMA UR QL SCN: NEGATIVE
METHADONE INTERNAL CONTROL: ABNORMAL
METHADONE UR QL SCN: NEGATIVE
METHAMPHETAMINE INTERNAL CONTROL: ABNORMAL
OPIATES INTERNAL CONTROL: ABNORMAL
OPIATES UR QL: NEGATIVE
OXYCODONE INTERNAL CONTROL: ABNORMAL
OXYCODONE UR QL SCN: NEGATIVE
PCP UR QL SCN: NEGATIVE
PHENCYCLIDINE INTERNAL CONTROL: ABNORMAL
THC INTERNAL CONTROL: ABNORMAL

## 2023-11-14 RX ORDER — DEXTROAMPHETAMINE SACCHARATE, AMPHETAMINE ASPARTATE, DEXTROAMPHETAMINE SULFATE AND AMPHETAMINE SULFATE 5; 5; 5; 5 MG/1; MG/1; MG/1; MG/1
TABLET ORAL
Qty: 60 TABLET | Refills: 0 | Status: SHIPPED | OUTPATIENT
Start: 2023-11-14

## 2023-11-14 RX ORDER — DEXTROAMPHETAMINE SACCHARATE, AMPHETAMINE ASPARTATE, DEXTROAMPHETAMINE SULFATE AND AMPHETAMINE SULFATE 5; 5; 5; 5 MG/1; MG/1; MG/1; MG/1
TABLET ORAL
Qty: 60 TABLET | Refills: 0 | OUTPATIENT
Start: 2023-11-14

## 2023-11-14 RX ORDER — DEXTROAMPHETAMINE SACCHARATE, AMPHETAMINE ASPARTATE, DEXTROAMPHETAMINE SULFATE AND AMPHETAMINE SULFATE 5; 5; 5; 5 MG/1; MG/1; MG/1; MG/1
TABLET ORAL
Qty: 60 TABLET | Refills: 0 | Status: CANCELLED | OUTPATIENT
Start: 2023-11-14

## 2023-11-15 ENCOUNTER — LAB (OUTPATIENT)
Dept: FAMILY MEDICINE CLINIC | Facility: CLINIC | Age: 63
End: 2023-11-15
Payer: MEDICARE

## 2023-11-16 LAB
ALBUMIN SERPL-MCNC: 4.9 G/DL (ref 3.9–4.9)
ALBUMIN/GLOB SERPL: 1.9 {RATIO} (ref 1.2–2.2)
ALP SERPL-CCNC: 98 IU/L (ref 44–121)
ALT SERPL-CCNC: 17 IU/L (ref 0–32)
AST SERPL-CCNC: 27 IU/L (ref 0–40)
BASOPHILS # BLD AUTO: 0 X10E3/UL (ref 0–0.2)
BASOPHILS NFR BLD AUTO: 1 %
BILIRUB SERPL-MCNC: 0.5 MG/DL (ref 0–1.2)
BUN SERPL-MCNC: 11 MG/DL (ref 8–27)
BUN/CREAT SERPL: 11 (ref 12–28)
CALCIUM SERPL-MCNC: 9.7 MG/DL (ref 8.7–10.3)
CHLORIDE SERPL-SCNC: 99 MMOL/L (ref 96–106)
CHOLEST SERPL-MCNC: 219 MG/DL (ref 100–199)
CO2 SERPL-SCNC: 23 MMOL/L (ref 20–29)
CREAT SERPL-MCNC: 0.97 MG/DL (ref 0.57–1)
EGFRCR SERPLBLD CKD-EPI 2021: 65 ML/MIN/1.73
EOSINOPHIL # BLD AUTO: 0.1 X10E3/UL (ref 0–0.4)
EOSINOPHIL NFR BLD AUTO: 2 %
ERYTHROCYTE [DISTWIDTH] IN BLOOD BY AUTOMATED COUNT: 13.1 % (ref 11.7–15.4)
GLOBULIN SER CALC-MCNC: 2.6 G/DL (ref 1.5–4.5)
GLUCOSE SERPL-MCNC: 114 MG/DL (ref 70–99)
HCT VFR BLD AUTO: 42.4 % (ref 34–46.6)
HDLC SERPL-MCNC: 67 MG/DL
HGB BLD-MCNC: 14.5 G/DL (ref 11.1–15.9)
IMM GRANULOCYTES # BLD AUTO: 0 X10E3/UL (ref 0–0.1)
IMM GRANULOCYTES NFR BLD AUTO: 0 %
LDLC SERPL CALC-MCNC: 137 MG/DL (ref 0–99)
LYMPHOCYTES # BLD AUTO: 1.8 X10E3/UL (ref 0.7–3.1)
LYMPHOCYTES NFR BLD AUTO: 28 %
MCH RBC QN AUTO: 33.2 PG (ref 26.6–33)
MCHC RBC AUTO-ENTMCNC: 34.2 G/DL (ref 31.5–35.7)
MCV RBC AUTO: 97 FL (ref 79–97)
MONOCYTES # BLD AUTO: 0.6 X10E3/UL (ref 0.1–0.9)
MONOCYTES NFR BLD AUTO: 10 %
NEUTROPHILS # BLD AUTO: 3.8 X10E3/UL (ref 1.4–7)
NEUTROPHILS NFR BLD AUTO: 59 %
PLATELET # BLD AUTO: 254 X10E3/UL (ref 150–450)
POTASSIUM SERPL-SCNC: 4.7 MMOL/L (ref 3.5–5.2)
PROT SERPL-MCNC: 7.5 G/DL (ref 6–8.5)
RBC # BLD AUTO: 4.37 X10E6/UL (ref 3.77–5.28)
SODIUM SERPL-SCNC: 140 MMOL/L (ref 134–144)
T4 FREE SERPL-MCNC: 0.88 NG/DL (ref 0.82–1.77)
TRIGL SERPL-MCNC: 85 MG/DL (ref 0–149)
TSH SERPL DL<=0.005 MIU/L-ACNC: 22.5 UIU/ML (ref 0.45–4.5)
VLDLC SERPL CALC-MCNC: 15 MG/DL (ref 5–40)
WBC # BLD AUTO: 6.5 X10E3/UL (ref 3.4–10.8)

## 2023-11-27 ENCOUNTER — TELEMEDICINE (OUTPATIENT)
Dept: PSYCHIATRY | Facility: CLINIC | Age: 63
End: 2023-11-27
Payer: MEDICARE

## 2023-11-27 DIAGNOSIS — F34.1 DYSTHYMIA: ICD-10-CM

## 2023-11-27 DIAGNOSIS — F90.2 ATTENTION DEFICIT HYPERACTIVITY DISORDER (ADHD), COMBINED TYPE: Primary | ICD-10-CM

## 2023-11-27 PROCEDURE — 1159F MED LIST DOCD IN RCRD: CPT | Performed by: NURSE PRACTITIONER

## 2023-11-27 PROCEDURE — 1160F RVW MEDS BY RX/DR IN RCRD: CPT | Performed by: NURSE PRACTITIONER

## 2023-11-27 PROCEDURE — 99214 OFFICE O/P EST MOD 30 MIN: CPT | Performed by: NURSE PRACTITIONER

## 2023-11-27 NOTE — PROGRESS NOTES
Patient Name: Celestina Sullivan  MRN: [id number]   :  1960     This provider is located at her home office through the [facility] (through [facility]), [address], [address] using a secure [employer] Video Visit through [employer]. Patient is being seen remotely via telehealth at their home address in [place], and stated they are in a secure environment for this session. The patient's condition being diagnosed/treated is appropriate for telemedicine. The provider identified herself as well as her credentials.   The patient, and/or patients guardian, consent to be seen remotely, and when consent is given they understand that the consent allows for patient identifiable information to be sent to a third party as needed.   They may refuse to be seen remotely at any time. The electronic data is encrypted and password protected, and the patient and/or guardian has been advised of the potential risks to privacy not withstanding such measures.    You have chosen to receive care through a telehealth visit.  Do you consent to use a video/audio connection for your medical care today? Yes    Chief Complaint:      ICD-10-CM ICD-9-CM   1. Attention deficit hyperactivity disorder (ADHD), combined type  F90.2 314.01   2. Dysthymia  F34.1 300.4       History of Present Illness: Celestina Sullivan is a 63 y.o. female is here today for medication management follow up.  Patient states meds are working well for focus and task completion.  States sleep is good.  Denies depression.  States she does have some social anxiety.  Plans to go help her daughter and her soon to be  grandchild in [place].    The following portions of the patient's history were reviewed and updated as appropriate: allergies, current medications, past family history, past medical history, past social history, past surgical history, and problem list.    Review of Systems;;  Review of Systems   Constitutional:   Negative for activity change, appetite change, fatigue, unexpected weight gain and unexpected weight loss.   Respiratory:  Negative for shortness of breath and wheezing.    Gastrointestinal:  Negative for constipation, diarrhea, nausea and vomiting.   Musculoskeletal:  Negative for gait problem.   Skin:  Negative for dry skin and rash.   Neurological:  Negative for dizziness, speech difficulty, weakness, light-headedness, headache, memory problem and confusion.   Psychiatric/Behavioral:  Negative for agitation, behavioral problems, decreased concentration, dysphoric mood, hallucinations, self-injury, sleep disturbance, suicidal ideas, negative for hyperactivity, depressed mood and stress. The patient is not nervous/anxious.        Physical Exam;;  Physical Exam  Constitutional:       General: She is not in acute distress.     Appearance: She is well-developed. She is not diaphoretic.   HENT:      Head: Normocephalic and atraumatic.   Eyes:      Conjunctiva/sclera: Conjunctivae normal.   Pulmonary:      Effort: Pulmonary effort is normal. No respiratory distress.   Musculoskeletal:         General: Normal range of motion.      Cervical back: Full passive range of motion without pain and normal range of motion.   Neurological:      Mental Status: She is alert and oriented to person, place, and time.   Psychiatric:         Mood and Affect: Mood is not anxious or depressed. Affect is not labile, blunt, angry or inappropriate.         Speech: Speech is not rapid and pressured or tangential.         Behavior: Behavior normal. Behavior is not agitated, slowed, aggressive, withdrawn, hyperactive or combative. Behavior is cooperative.         Thought Content: Thought content normal. Thought content is not paranoid or delusional. Thought content does not include homicidal or suicidal ideation. Thought content does not include homicidal or suicidal plan.         Judgment: Judgment normal.       There were no vitals taken for  this visit.  There is no height or weight on file to calculate BMI. Video appt unable to obtain.     Current Medications;;    Current Outpatient Medications:     amphetamine-dextroamphetamine (Adderall) 20 MG tablet, Take 20 mg orally every morning and afternoon., Disp: 60 tablet, Rfl: 0    ibuprofen (ADVIL,MOTRIN) 800 MG tablet, Take 1 tablet by mouth Every 6 (Six) Hours As Needed for Moderate Pain., Disp: 60 tablet, Rfl: 3    levothyroxine (SYNTHROID, LEVOTHROID) 137 MCG tablet, TAKE 1 TABLET BY MOUTH DAILY, Disp: 90 tablet, Rfl: 0    rosuvastatin (CRESTOR) 10 MG tablet, TAKE ONE TABLET BY MOUTH DAILY, Disp: 90 tablet, Rfl: 1    Lab Results:   Orders Only on 11/15/2023   Component Date Value Ref Range Status    WBC 11/15/2023 6.5  3.4 - 10.8 x10E3/uL Final    RBC 11/15/2023 4.37  3.77 - 5.28 x10E6/uL Final    Hemoglobin 11/15/2023 14.5  11.1 - 15.9 g/dL Final    Hematocrit 11/15/2023 42.4  34.0 - 46.6 % Final    MCV 11/15/2023 97  79 - 97 fL Final    MCH 11/15/2023 33.2 (H)  26.6 - 33.0 pg Final    MCHC 11/15/2023 34.2  31.5 - 35.7 g/dL Final    RDW 11/15/2023 13.1  11.7 - 15.4 % Final    Platelets 11/15/2023 254  150 - 450 x10E3/uL Final    Neutrophil Rel % 11/15/2023 59  Not Estab. % Final    Lymphocyte Rel % 11/15/2023 28  Not Estab. % Final    Monocyte Rel % 11/15/2023 10  Not Estab. % Final    Eosinophil Rel % 11/15/2023 2  Not Estab. % Final    Basophil Rel % 11/15/2023 1  Not Estab. % Final    Neutrophils Absolute 11/15/2023 3.8  1.4 - 7.0 x10E3/uL Final    Lymphocytes Absolute 11/15/2023 1.8  0.7 - 3.1 x10E3/uL Final    Monocytes Absolute 11/15/2023 0.6  0.1 - 0.9 x10E3/uL Final    Eosinophils Absolute 11/15/2023 0.1  0.0 - 0.4 x10E3/uL Final    Basophils Absolute 11/15/2023 0.0  0.0 - 0.2 x10E3/uL Final    Immature Granulocyte Rel % 11/15/2023 0  Not Estab. % Final    Immature Grans Absolute 11/15/2023 0.0  0.0 - 0.1 x10E3/uL Final    Glucose 11/15/2023 114 (H)  70 - 99 mg/dL Final    BUN 11/15/2023 11  8  - 27 mg/dL Final    Creatinine 11/15/2023 0.97  0.57 - 1.00 mg/dL Final    EGFR Result 11/15/2023 65  >59 mL/min/1.73 Final    BUN/Creatinine Ratio 11/15/2023 11 (L)  12 - 28 Final    Sodium 11/15/2023 140  134 - 144 mmol/L Final    Potassium 11/15/2023 4.7  3.5 - 5.2 mmol/L Final    Chloride 11/15/2023 99  96 - 106 mmol/L Final    Total CO2 11/15/2023 23  20 - 29 mmol/L Final    Calcium 11/15/2023 9.7  8.7 - 10.3 mg/dL Final    Total Protein 11/15/2023 7.5  6.0 - 8.5 g/dL Final    Albumin 11/15/2023 4.9  3.9 - 4.9 g/dL Final    Globulin 11/15/2023 2.6  1.5 - 4.5 g/dL Final    A/G Ratio 11/15/2023 1.9  1.2 - 2.2 Final    Total Bilirubin 11/15/2023 0.5  0.0 - 1.2 mg/dL Final    Alkaline Phosphatase 11/15/2023 98  44 - 121 IU/L Final    AST (SGOT) 11/15/2023 27  0 - 40 IU/L Final    ALT (SGPT) 11/15/2023 17  0 - 32 IU/L Final    Total Cholesterol 11/15/2023 219 (H)  100 - 199 mg/dL Final    Triglycerides 11/15/2023 85  0 - 149 mg/dL Final    HDL Cholesterol 11/15/2023 67  >39 mg/dL Final    VLDL Cholesterol Doug 11/15/2023 15  5 - 40 mg/dL Final    LDL Chol Calc (NIH) 11/15/2023 137 (H)  0 - 99 mg/dL Final    Free T4 11/15/2023 0.88  0.82 - 1.77 ng/dL Final    TSH 11/15/2023 22.500 (H)  0.450 - 4.500 uIU/mL Final   Clinical Support on 11/14/2023   Component Date Value Ref Range Status    Amphetamine Screen, Urine 11/14/2023 Positive (A)  Negative Final    AMP INTERNAL CONTROL 11/14/2023 Passed  Passed Final    Barbiturates Screen, Urine 11/14/2023 Negative  Negative Final    BARBITURATE INTERNAL CONTROL 11/14/2023 Passed  Passed Final    Buprenorphine, Screen, Urine 11/14/2023 Negative  Negative Final    BUPRENORPHINE INTERNAL CONTROL 11/14/2023 Passed  Passed Final    Benzodiazepine Screen, Urine 11/14/2023 Negative  Negative Final    BENZODIAZEPINE INTERNAL CONTROL 11/14/2023 Passed  Passed Final    Cocaine Screen, Urine 11/14/2023 Negative  Negative Final    COCAINE INTERNAL CONTROL 11/14/2023 Passed  Passed Final     MDMA (ECSTASY) 11/14/2023 Negative  Negative Final    MDMA (ECSTASY) INTERNAL CONTROL 11/14/2023 Passed  Passed Final    Methamphetamine, Ur 11/14/2023 Negative  Negative Final    METHAMPHETAMINE INTERNAL CONTROL 11/14/2023 Passed  Passed Final    Methadone Screen, Urine 11/14/2023 Negative  Negative Final    METHADONE INTERNAL CONTROL 11/14/2023 Passed  Passed Final    Opiate Screen 11/14/2023 Negative  Negative Final    OPIATES INTERNAL CONTROL 11/14/2023 Passed  Passed Final    Oxycodone Screen, Urine 11/14/2023 Negative  Negative Final    OXYCODONE INTERNAL CONTROL 11/14/2023 Passed  Passed Final    Phencyclidine (PCP), Urine 11/14/2023 Negative  Negative Final    PHENCYCLIDINE INTERNAL CONTROL 11/14/2023 Passed  Passed Final    THC, Screen, Urine 11/14/2023 Negative  Negative Final    THC INTERNAL CONTROL 11/14/2023 Passed  Passed Final    Lot Number 11/14/2023 [id number]   Final    Expiration Date 11/14/2023 07/23/2025   Final       Mental Status Exam:   Hygiene:   good  Cooperation:  Cooperative  Eye Contact:  Good  Psychomotor Behavior:  Appropriate  Mood:within normal limits  Affect:  Appropriate  Hopelessness: Denies  Speech:  Normal  Thought Process:  Goal directed  Thought Content:  Normal  Suicidal:  None  Homicidal:  None  Hallucinations:  None  Delusion:  None  Memory:  Intact  Orientation:  Person, Place, Time, and Situation  Reliability:  good  Insight:  Good  Judgement:  Good  Impulse Control:  Good    PHQ-9 Depression Screening  Little interest or pleasure in doing things? (P) 1-->several days   Feeling down, depressed, or hopeless? (P) 1-->several days   Trouble falling or staying asleep, or sleeping too much? (P) 0-->not at all   Feeling tired or having little energy? (P) 0-->not at all   Poor appetite or overeating? (P) 0-->not at all   Feeling bad about yourself - or that you are a failure or have let yourself or your family down? (P) 0-->not at all   Trouble concentrating on things, such as  reading the newspaper or watching television? (P) 1-->several days   Moving or speaking so slowly that other people could have noticed? Or the opposite - being so fidgety or restless that you have been moving around a lot more than usual? (P) 1-->several days   Thoughts that you would be better off dead, or of hurting yourself in some way? (P) 0-->not at all   PHQ-9 Total Score (P) 4   If you checked off any problems, how difficult have these problems made it for you to do your work, take care of things at home, or get along with other people? (P) not difficult at all      Reviewed [facility] # [id number]     Assessment/Plan:  Diagnoses and all orders for this visit:    1. Attention deficit hyperactivity disorder (ADHD), combined type (Primary)    2. Dysthymia      Patient denies depression.  States she has some social anxiety.  Sleep is good and medications are good as they are.  Denies need for medication for depression or anxiety.  We will continue medication as ordered.  Patient may be going to [place] to help care for her  grandchild.  Patient will have to find a provider in [place] as we cannot send controlled substances to [place].    A psychological evaluation was conducted in order to assess past and current level of functioning. Areas assessed included, but were not limited to: perception of social support, perception of ability to face and deal with challenges in life (positive functioning), anxiety symptoms, depressive symptoms, perspective on beliefs/belief system, coping skills for stress, intelligence level,  Therapeutic rapport was established. Interventions conducted today were geared towards incorporating medication management along with support for continued therapy. Education was also provided as to the med management with this provider and what to expect in subsequent sessions.    We discussed risks, benefits,goals and side effects of the above medication and the patient was agreeable with the  plan.Patient was educated on the importance of compliance with treatment and follow-up appointments. Patient is aware to contact the [facility] [phone] with any worsening of symptoms. To call for questions or concerns and return early if necessary. Patent is agreeable to go to the Emergency Department or call 911 should they begin SI/HI.     Part of this note may be an electronic transcription/translation of spoken language to printed text using the Dragon Dictation System.    Return in about 3 months (around 2/27/2024) for Follow Up 30 min, Video visit.    Francine Vickers, APRN

## 2023-12-14 ENCOUNTER — TELEPHONE (OUTPATIENT)
Dept: FAMILY MEDICINE CLINIC | Facility: CLINIC | Age: 63
End: 2023-12-14

## 2023-12-14 NOTE — TELEPHONE ENCOUNTER
Spoke with patient. She is In [place] till middle of next month. Has a fever blister and over the counter doesn't work. Pt stated We had sent in a prescription for acyclovir cream in the past and wants to know if we can send in a prescription for her. She said she can feel it all over her top lip and her nose. If possible please send [place] [address]. Please call patient back at earliest convenience.I did let her know you wouldn't see this till tomorrow 12/15/23.      Pharmacy    Phone [phone] Fax: [phone]

## 2023-12-15 NOTE — TELEPHONE ENCOUNTER
Caller: Celestina Sullivan    Relationship to patient: Self    Best call back number: [phone]     Patient is needing: PATIENT REQUESTING REFILL OF ACYCLOVIR.    STATED SHE HAS FEVER BLISTER ON HER LIPS AND IN NOSE.    REQUESTS CALL BACK WHEN SENT TO PHARMACY.      [facility]  [id number] - [place], [place] - [address] - [phone]  - [phone]  [phone]

## 2023-12-19 RX ORDER — LEVOTHYROXINE SODIUM 137 UG/1
TABLET ORAL
Qty: 90 TABLET | Refills: 0 | Status: SHIPPED | OUTPATIENT
Start: 2023-12-19

## 2024-03-12 ENCOUNTER — TELEPHONE (OUTPATIENT)
Dept: FAMILY MEDICINE CLINIC | Facility: CLINIC | Age: 64
End: 2024-03-12

## 2024-04-15 DIAGNOSIS — F90.2 ATTENTION DEFICIT HYPERACTIVITY DISORDER (ADHD), COMBINED TYPE: ICD-10-CM

## 2024-04-15 RX ORDER — DEXTROAMPHETAMINE SACCHARATE, AMPHETAMINE ASPARTATE, DEXTROAMPHETAMINE SULFATE AND AMPHETAMINE SULFATE 5; 5; 5; 5 MG/1; MG/1; MG/1; MG/1
TABLET ORAL
Qty: 60 TABLET | Refills: 0 | Status: SHIPPED | OUTPATIENT
Start: 2024-04-15

## 2024-04-15 NOTE — TELEPHONE ENCOUNTER
Patient called stating she is now back in town in [place].  She states that she had to stay longer than expected with her daughter because she was having post partum.  Patient states she is back in town and completely out of medication.  Patient could not get an appointment with provider until 04/30/2024.  Patient feels she can not wait this long for medication.  Please advise.

## 2024-04-30 ENCOUNTER — TELEMEDICINE (OUTPATIENT)
Dept: PSYCHIATRY | Facility: CLINIC | Age: 64
End: 2024-04-30
Payer: MEDICARE

## 2024-04-30 DIAGNOSIS — F90.2 ATTENTION DEFICIT HYPERACTIVITY DISORDER (ADHD), COMBINED TYPE: ICD-10-CM

## 2024-04-30 PROCEDURE — 1160F RVW MEDS BY RX/DR IN RCRD: CPT | Performed by: NURSE PRACTITIONER

## 2024-04-30 PROCEDURE — 99214 OFFICE O/P EST MOD 30 MIN: CPT | Performed by: NURSE PRACTITIONER

## 2024-04-30 PROCEDURE — 1159F MED LIST DOCD IN RCRD: CPT | Performed by: NURSE PRACTITIONER

## 2024-04-30 RX ORDER — DEXTROAMPHETAMINE SACCHARATE, AMPHETAMINE ASPARTATE, DEXTROAMPHETAMINE SULFATE AND AMPHETAMINE SULFATE 5; 5; 5; 5 MG/1; MG/1; MG/1; MG/1
TABLET ORAL
Qty: 60 TABLET | Refills: 0 | Status: CANCELLED | OUTPATIENT
Start: 2024-04-30

## 2024-04-30 RX ORDER — LISDEXAMFETAMINE DIMESYLATE 40 MG/1
40 CAPSULE ORAL EVERY MORNING
Qty: 15 CAPSULE | Refills: 0 | Status: SHIPPED | OUTPATIENT
Start: 2024-04-30

## 2024-05-03 ENCOUNTER — TELEPHONE (OUTPATIENT)
Dept: FAMILY MEDICINE CLINIC | Facility: CLINIC | Age: 64
End: 2024-05-03
Payer: MEDICARE

## 2024-05-13 ENCOUNTER — TELEPHONE (OUTPATIENT)
Dept: PSYCHIATRY | Facility: CLINIC | Age: 64
End: 2024-05-13
Payer: MEDICARE

## 2024-05-14 ENCOUNTER — PRIOR AUTHORIZATION (OUTPATIENT)
Dept: PSYCHIATRY | Facility: CLINIC | Age: 64
End: 2024-05-14

## 2024-05-14 DIAGNOSIS — F90.2 ATTENTION DEFICIT HYPERACTIVITY DISORDER (ADHD), COMBINED TYPE: Primary | ICD-10-CM

## 2024-05-15 RX ORDER — ATOMOXETINE 18 MG/1
18 CAPSULE ORAL DAILY
Qty: 30 CAPSULE | Refills: 1 | Status: SHIPPED | OUTPATIENT
Start: 2024-05-15

## 2024-06-04 ENCOUNTER — TELEPHONE (OUTPATIENT)
Dept: PSYCHIATRY | Facility: CLINIC | Age: 64
End: 2024-06-04

## 2024-06-04 NOTE — TELEPHONE ENCOUNTER
Patient to reschedule missed appointment from this morning. Patient is rescheduled to 06/10/24. Patient ask to make provider aware that Vyvanse 40mg just did not work out for patient. Patient stated she has discontinued the medication and will discuss with provider at next appointment.

## 2024-06-10 ENCOUNTER — PRIOR AUTHORIZATION (OUTPATIENT)
Dept: PSYCHIATRY | Facility: CLINIC | Age: 64
End: 2024-06-10

## 2024-06-10 ENCOUNTER — TELEMEDICINE (OUTPATIENT)
Dept: PSYCHIATRY | Facility: CLINIC | Age: 64
End: 2024-06-10
Payer: MEDICARE

## 2024-06-10 DIAGNOSIS — F90.2 ATTENTION DEFICIT HYPERACTIVITY DISORDER (ADHD), COMBINED TYPE: Primary | ICD-10-CM

## 2024-06-10 DIAGNOSIS — F34.1 DYSTHYMIA: ICD-10-CM

## 2024-06-10 PROCEDURE — 99214 OFFICE O/P EST MOD 30 MIN: CPT | Performed by: NURSE PRACTITIONER

## 2024-06-10 PROCEDURE — 1160F RVW MEDS BY RX/DR IN RCRD: CPT | Performed by: NURSE PRACTITIONER

## 2024-06-10 PROCEDURE — 1159F MED LIST DOCD IN RCRD: CPT | Performed by: NURSE PRACTITIONER

## 2024-06-10 RX ORDER — LISDEXAMFETAMINE DIMESYLATE 40 MG/1
40 CAPSULE ORAL EVERY MORNING
Qty: 30 CAPSULE | Refills: 0 | Status: SHIPPED | OUTPATIENT
Start: 2024-06-10

## 2024-06-10 NOTE — PROGRESS NOTES
Patient Name: Celestina Sullivan  MRN: [id number]   :  1960     This provider is located at her home office through the [facility] (through [facility]), [address], [address] using a secure [employer] Video Visit through [employer]. Patient is being seen remotely via telehealth at their home address in [place], and stated they are in a secure environment for this session. The patient's condition being diagnosed/treated is appropriate for telemedicine. The provider identified herself as well as her credentials.   The patient, and/or patients guardian, consent to be seen remotely, and when consent is given they understand that the consent allows for patient identifiable information to be sent to a third party as needed.   They may refuse to be seen remotely at any time. The electronic data is encrypted and password protected, and the patient and/or guardian has been advised of the potential risks to privacy not withstanding such measures.    You have chosen to receive care through a telehealth visit.  Do you consent to use a video/audio connection for your medical care today? Yes    Chief Complaint:      ICD-10-CM ICD-9-CM   1. Attention deficit hyperactivity disorder (ADHD), combined type  F90.2 314.01   2. Dysthymia  F34.1 300.4       History of Present Illness: Celestina Sullivan is a 64 y.o. female is here today for medication management follow up.  Patient trial of Strattera per her insurance requirement.  Patient was nauseous and very tired.  Now that she has tried this she would like to try Vyvanse as she wanted to initially.    The following portions of the patient's history were reviewed and updated as appropriate: allergies, current medications, past family history, past medical history, past social history, past surgical history, and problem list.    Review of Systems;;  Review of Systems   Constitutional:  Negative for activity change, appetite change,  fatigue, unexpected weight gain and unexpected weight loss.   Respiratory:  Negative for shortness of breath and wheezing.    Gastrointestinal:  Negative for constipation, diarrhea, nausea and vomiting.   Musculoskeletal:  Negative for gait problem.   Skin:  Negative for dry skin and rash.   Neurological:  Negative for dizziness, speech difficulty, weakness, light-headedness, headache, memory problem and confusion.   Psychiatric/Behavioral:  Positive for decreased concentration. Negative for agitation, behavioral problems, dysphoric mood, hallucinations, self-injury, sleep disturbance, suicidal ideas, negative for hyperactivity, depressed mood and stress. The patient is not nervous/anxious.        Physical Exam;;  Physical Exam  Constitutional:       General: She is not in acute distress.     Appearance: She is well-developed. She is not diaphoretic.   HENT:      Head: Normocephalic and atraumatic.   Eyes:      Conjunctiva/sclera: Conjunctivae normal.   Pulmonary:      Effort: Pulmonary effort is normal. No respiratory distress.   Musculoskeletal:         General: Normal range of motion.      Cervical back: Full passive range of motion without pain and normal range of motion.   Neurological:      Mental Status: She is alert and oriented to person, place, and time.   Psychiatric:         Mood and Affect: Mood is not anxious or depressed. Affect is not labile, blunt, angry or inappropriate.         Speech: Speech is not rapid and pressured or tangential.         Behavior: Behavior normal. Behavior is not agitated, slowed, aggressive, withdrawn, hyperactive or combative. Behavior is cooperative.         Thought Content: Thought content normal. Thought content is not paranoid or delusional. Thought content does not include homicidal or suicidal ideation. Thought content does not include homicidal or suicidal plan.         Judgment: Judgment normal.       There were no vitals taken for this visit.  There is no height or  weight on file to calculate BMI. Video appt unable to obtain.     Current Medications;;    Current Outpatient Medications:     lisdexamfetamine (Vyvanse) 40 MG capsule, Take 1 capsule by mouth Every Morning, Disp: 30 capsule, Rfl: 0    amphetamine-dextroamphetamine (Adderall) 20 MG tablet, Take 20 mg orally every morning and afternoon., Disp: 60 tablet, Rfl: 0    atomoxetine (Strattera) 18 MG capsule, Take 1 capsule by mouth Daily., Disp: 30 capsule, Rfl: 1    ibuprofen (ADVIL,MOTRIN) 800 MG tablet, Take 1 tablet by mouth Every 6 (Six) Hours As Needed for Moderate Pain., Disp: 60 tablet, Rfl: 3    levothyroxine (SYNTHROID, LEVOTHROID) 137 MCG tablet, TAKE 1 TABLET BY MOUTH DAILY, Disp: 90 tablet, Rfl: 0    rosuvastatin (CRESTOR) 10 MG tablet, TAKE ONE TABLET BY MOUTH DAILY, Disp: 90 tablet, Rfl: 1    Lab Results:   No visits with results within 3 Month(s) from this visit.   Latest known visit with results is:   Orders Only on 11/15/2023   Component Date Value Ref Range Status    WBC 11/15/2023 6.5  3.4 - 10.8 x10E3/uL Final    RBC 11/15/2023 4.37  3.77 - 5.28 x10E6/uL Final    Hemoglobin 11/15/2023 14.5  11.1 - 15.9 g/dL Final    Hematocrit 11/15/2023 42.4  34.0 - 46.6 % Final    MCV 11/15/2023 97  79 - 97 fL Final    MCH 11/15/2023 33.2 (H)  26.6 - 33.0 pg Final    MCHC 11/15/2023 34.2  31.5 - 35.7 g/dL Final    RDW 11/15/2023 13.1  11.7 - 15.4 % Final    Platelets 11/15/2023 254  150 - 450 x10E3/uL Final    Neutrophil Rel % 11/15/2023 59  Not Estab. % Final    Lymphocyte Rel % 11/15/2023 28  Not Estab. % Final    Monocyte Rel % 11/15/2023 10  Not Estab. % Final    Eosinophil Rel % 11/15/2023 2  Not Estab. % Final    Basophil Rel % 11/15/2023 1  Not Estab. % Final    Neutrophils Absolute 11/15/2023 3.8  1.4 - 7.0 x10E3/uL Final    Lymphocytes Absolute 11/15/2023 1.8  0.7 - 3.1 x10E3/uL Final    Monocytes Absolute 11/15/2023 0.6  0.1 - 0.9 x10E3/uL Final    Eosinophils Absolute 11/15/2023 0.1  0.0 - 0.4 x10E3/uL  Final    Basophils Absolute 11/15/2023 0.0  0.0 - 0.2 x10E3/uL Final    Immature Granulocyte Rel % 11/15/2023 0  Not Estab. % Final    Immature Grans Absolute 11/15/2023 0.0  0.0 - 0.1 x10E3/uL Final    Glucose 11/15/2023 114 (H)  70 - 99 mg/dL Final    BUN 11/15/2023 11  8 - 27 mg/dL Final    Creatinine 11/15/2023 0.97  0.57 - 1.00 mg/dL Final    EGFR Result 11/15/2023 65  >59 mL/min/1.73 Final    BUN/Creatinine Ratio 11/15/2023 11 (L)  12 - 28 Final    Sodium 11/15/2023 140  134 - 144 mmol/L Final    Potassium 11/15/2023 4.7  3.5 - 5.2 mmol/L Final    Chloride 11/15/2023 99  96 - 106 mmol/L Final    Total CO2 11/15/2023 23  20 - 29 mmol/L Final    Calcium 11/15/2023 9.7  8.7 - 10.3 mg/dL Final    Total Protein 11/15/2023 7.5  6.0 - 8.5 g/dL Final    Albumin 11/15/2023 4.9  3.9 - 4.9 g/dL Final    Globulin 11/15/2023 2.6  1.5 - 4.5 g/dL Final    A/G Ratio 11/15/2023 1.9  1.2 - 2.2 Final    Total Bilirubin 11/15/2023 0.5  0.0 - 1.2 mg/dL Final    Alkaline Phosphatase 11/15/2023 98  44 - 121 IU/L Final    AST (SGOT) 11/15/2023 27  0 - 40 IU/L Final    ALT (SGPT) 11/15/2023 17  0 - 32 IU/L Final    Total Cholesterol 11/15/2023 219 (H)  100 - 199 mg/dL Final    Triglycerides 11/15/2023 85  0 - 149 mg/dL Final    HDL Cholesterol 11/15/2023 67  >39 mg/dL Final    VLDL Cholesterol Doug 11/15/2023 15  5 - 40 mg/dL Final    LDL Chol Calc (NIH) 11/15/2023 137 (H)  0 - 99 mg/dL Final    Free T4 11/15/2023 0.88  0.82 - 1.77 ng/dL Final    TSH 11/15/2023 22.500 (H)  0.450 - 4.500 uIU/mL Final       Mental Status Exam:   Hygiene:   good  Cooperation:  Cooperative  Eye Contact:  Good  Psychomotor Behavior:  Appropriate  Mood:within normal limits  Affect:  Appropriate  Hopelessness: Denies  Speech:  Normal  Thought Process:  Goal directed  Thought Content:  Normal  Suicidal:  None  Homicidal:  None  Hallucinations:  None  Delusion:  None  Memory:  Intact  Orientation:  Person, Place, Time, and Situation  Reliability:   good  Insight:  Good  Judgement:  Good  Impulse Control:  Good    PHQ-9 Depression Screening  Little interest or pleasure in doing things?     Feeling down, depressed, or hopeless?     Trouble falling or staying asleep, or sleeping too much?     Feeling tired or having little energy?     Poor appetite or overeating?     Feeling bad about yourself - or that you are a failure or have let yourself or your family down?     Trouble concentrating on things, such as reading the newspaper or watching television?     Moving or speaking so slowly that other people could have noticed? Or the opposite - being so fidgety or restless that you have been moving around a lot more than usual?     Thoughts that you would be better off dead, or of hurting yourself in some way?     PHQ-9 Total Score     If you checked off any problems, how difficult have these problems made it for you to do your work, take care of things at home, or get along with other people?        MARY CLARK # [id number]     Assessment/Plan:  Diagnoses and all orders for this visit:    1. Attention deficit hyperactivity disorder (ADHD), combined type (Primary)  -     lisdexamfetamine (Vyvanse) 40 MG capsule; Take 1 capsule by mouth Every Morning  Dispense: 30 capsule; Refill: 0    2. Dysthymia      Patient did not like Strattera.  Made her tired and nauseous.  Now we will try Vyvanse 40 mg every morning.    A psychological evaluation was conducted in order to assess past and current level of functioning. Areas assessed included, but were not limited to: perception of social support, perception of ability to face and deal with challenges in life (positive functioning), anxiety symptoms, depressive symptoms, perspective on beliefs/belief system, coping skills for stress, intelligence level,  Therapeutic rapport was established. Interventions conducted today were geared towards incorporating medication management along with support for continued therapy. Education  was also provided as to the med management with this provider and what to expect in subsequent sessions.    We discussed risks, benefits,goals and side effects of the above medication and the patient was agreeable with the plan.Patient was educated on the importance of compliance with treatment and follow-up appointments. Patient is aware to contact the [facility] [phone] with any worsening of symptoms. To call for questions or concerns and return early if necessary. Patent is agreeable to go to the Emergency Department or call 911 should they begin SI/HI.     Part of this note may be an electronic transcription/translation of spoken language to printed text using the Dragon Dictation System.    Return in about 4 weeks (around 7/8/2024) for Follow Up 30 min, Video visit.    TYLER Vasquez

## 2024-06-10 NOTE — TELEPHONE ENCOUNTER
Attempted to do a new PA for lisdexamfetamine but insurance requires an appeal to be submitted since original request has been within the last 60 days. Contacted insurance by phone ([phone]) and expedited an appeal for this patient.  Appeal has been submitted and decision should be made within 72 hours.  Ref # is [id number] and status request phone number is [phone].

## 2024-06-11 NOTE — TELEPHONE ENCOUNTER
Contact PA appeals department to check the status on this appeal.  They state it is still pending.

## 2024-07-09 ENCOUNTER — TELEMEDICINE (OUTPATIENT)
Dept: PSYCHIATRY | Facility: CLINIC | Age: 64
End: 2024-07-09
Payer: MEDICARE

## 2024-07-09 DIAGNOSIS — F34.1 DYSTHYMIA: ICD-10-CM

## 2024-07-09 DIAGNOSIS — F90.2 ATTENTION DEFICIT HYPERACTIVITY DISORDER (ADHD), COMBINED TYPE: Primary | ICD-10-CM

## 2024-07-09 PROCEDURE — 1160F RVW MEDS BY RX/DR IN RCRD: CPT | Performed by: NURSE PRACTITIONER

## 2024-07-09 PROCEDURE — 99214 OFFICE O/P EST MOD 30 MIN: CPT | Performed by: NURSE PRACTITIONER

## 2024-07-09 PROCEDURE — 1159F MED LIST DOCD IN RCRD: CPT | Performed by: NURSE PRACTITIONER

## 2024-07-09 RX ORDER — LISDEXAMFETAMINE DIMESYLATE 40 MG/1
40 CAPSULE ORAL EVERY MORNING
Qty: 30 CAPSULE | Refills: 0 | Status: SHIPPED | OUTPATIENT
Start: 2024-07-09 | End: 2024-07-25 | Stop reason: SDUPTHER

## 2024-07-09 NOTE — PROGRESS NOTES
Patient Name: Celestina Sullivan  MRN: [id number]   :  1960     This provider is located at her home office through the [facility] (through [facility]), [address], [address] using a secure "[employer]hart Video Visit through [employer] Patient is being seen remotely via telehealth at their home address in [place], and stated they are in a secure environment for this session. The patient's condition being diagnosed/treated is appropriate for telemedicine. The provider identified herself as well as her credentials.   The patient, and/or patients guardian, consent to be seen remotely, and when consent is given they understand that the consent allows for patient identifiable information to be sent to a third party as needed.   They may refuse to be seen remotely at any time. The electronic data is encrypted and password protected, and the patient and/or guardian has been advised of the potential risks to privacy not withstanding such measures.    You have chosen to receive care through a telehealth visit.  Do you consent to use a video/audio connection for your medical care today? Yes    Chief Complaint:      ICD-10-CM ICD-9-CM   1. Attention deficit hyperactivity disorder (ADHD), combined type  F90.2 314.01   2. Dysthymia  F34.1 300.4       History of Present Illness: Celestina Sullivan is a 64 y.o. female is here today for medication management follow up.  Patient states Vyvanse is much better than the other medications she has tried.  States Strattera made her sleep a lot.  States she is not all over the place now.  Sleep is good and appetite is good.  Mood is always better when she is around her family that lives in [place].    The following portions of the patient's history were reviewed and updated as appropriate: allergies, current medications, past family history, past medical history, past social history, past surgical history, and problem list.    Review of  Systems;;  Review of Systems   Constitutional:  Negative for activity change, appetite change, fatigue, unexpected weight gain and unexpected weight loss.   Respiratory:  Negative for shortness of breath and wheezing.    Gastrointestinal:  Negative for constipation, diarrhea, nausea and vomiting.   Musculoskeletal:  Negative for gait problem.   Skin:  Negative for dry skin and rash.   Neurological:  Negative for dizziness, speech difficulty, weakness, light-headedness, headache, memory problem and confusion.   Psychiatric/Behavioral:  Negative for agitation, behavioral problems, decreased concentration, dysphoric mood, hallucinations, self-injury, sleep disturbance, suicidal ideas, negative for hyperactivity, depressed mood and stress. The patient is not nervous/anxious.        Physical Exam;;  Physical Exam  Constitutional:       General: She is not in acute distress.     Appearance: She is well-developed. She is not diaphoretic.   HENT:      Head: Normocephalic and atraumatic.   Eyes:      Conjunctiva/sclera: Conjunctivae normal.   Pulmonary:      Effort: Pulmonary effort is normal. No respiratory distress.   Musculoskeletal:         General: Normal range of motion.      Cervical back: Full passive range of motion without pain and normal range of motion.   Neurological:      Mental Status: She is alert and oriented to person, place, and time.   Psychiatric:         Mood and Affect: Mood is not anxious or depressed. Affect is not labile, blunt, angry or inappropriate.         Speech: Speech is not rapid and pressured or tangential.         Behavior: Behavior normal. Behavior is not agitated, slowed, aggressive, withdrawn, hyperactive or combative. Behavior is cooperative.         Thought Content: Thought content normal. Thought content is not paranoid or delusional. Thought content does not include homicidal or suicidal ideation. Thought content does not include homicidal or suicidal plan.         Judgment:  Judgment normal.       There were no vitals taken for this visit.  There is no height or weight on file to calculate BMI. Video appt unable to obtain.     Current Medications;;    Current Outpatient Medications:     ibuprofen (ADVIL,MOTRIN) 800 MG tablet, Take 1 tablet by mouth Every 6 (Six) Hours As Needed for Moderate Pain., Disp: 60 tablet, Rfl: 3    levothyroxine (SYNTHROID, LEVOTHROID) 137 MCG tablet, TAKE 1 TABLET BY MOUTH DAILY, Disp: 30 tablet, Rfl: 0    lisdexamfetamine (Vyvanse) 40 MG capsule, Take 1 capsule by mouth Every Morning, Disp: 30 capsule, Rfl: 0    rosuvastatin (CRESTOR) 10 MG tablet, TAKE ONE TABLET BY MOUTH DAILY, Disp: 90 tablet, Rfl: 1    Lab Results:   No visits with results within 3 Month(s) from this visit.   Latest known visit with results is:   Orders Only on 11/15/2023   Component Date Value Ref Range Status    WBC 11/15/2023 6.5  3.4 - 10.8 x10E3/uL Final    RBC 11/15/2023 4.37  3.77 - 5.28 x10E6/uL Final    Hemoglobin 11/15/2023 14.5  11.1 - 15.9 g/dL Final    Hematocrit 11/15/2023 42.4  34.0 - 46.6 % Final    MCV 11/15/2023 97  79 - 97 fL Final    MCH 11/15/2023 33.2 (H)  26.6 - 33.0 pg Final    MCHC 11/15/2023 34.2  31.5 - 35.7 g/dL Final    RDW 11/15/2023 13.1  11.7 - 15.4 % Final    Platelets 11/15/2023 254  150 - 450 x10E3/uL Final    Neutrophil Rel % 11/15/2023 59  Not Estab. % Final    Lymphocyte Rel % 11/15/2023 28  Not Estab. % Final    Monocyte Rel % 11/15/2023 10  Not Estab. % Final    Eosinophil Rel % 11/15/2023 2  Not Estab. % Final    Basophil Rel % 11/15/2023 1  Not Estab. % Final    Neutrophils Absolute 11/15/2023 3.8  1.4 - 7.0 x10E3/uL Final    Lymphocytes Absolute 11/15/2023 1.8  0.7 - 3.1 x10E3/uL Final    Monocytes Absolute 11/15/2023 0.6  0.1 - 0.9 x10E3/uL Final    Eosinophils Absolute 11/15/2023 0.1  0.0 - 0.4 x10E3/uL Final    Basophils Absolute 11/15/2023 0.0  0.0 - 0.2 x10E3/uL Final    Immature Granulocyte Rel % 11/15/2023 0  Not Estab. % Final    Immature  Grans Absolute 11/15/2023 0.0  0.0 - 0.1 x10E3/uL Final    Glucose 11/15/2023 114 (H)  70 - 99 mg/dL Final    BUN 11/15/2023 11  8 - 27 mg/dL Final    Creatinine 11/15/2023 0.97  0.57 - 1.00 mg/dL Final    EGFR Result 11/15/2023 65  >59 mL/min/1.73 Final    BUN/Creatinine Ratio 11/15/2023 11 (L)  12 - 28 Final    Sodium 11/15/2023 140  134 - 144 mmol/L Final    Potassium 11/15/2023 4.7  3.5 - 5.2 mmol/L Final    Chloride 11/15/2023 99  96 - 106 mmol/L Final    Total CO2 11/15/2023 23  20 - 29 mmol/L Final    Calcium 11/15/2023 9.7  8.7 - 10.3 mg/dL Final    Total Protein 11/15/2023 7.5  6.0 - 8.5 g/dL Final    Albumin 11/15/2023 4.9  3.9 - 4.9 g/dL Final    Globulin 11/15/2023 2.6  1.5 - 4.5 g/dL Final    A/G Ratio 11/15/2023 1.9  1.2 - 2.2 Final    Total Bilirubin 11/15/2023 0.5  0.0 - 1.2 mg/dL Final    Alkaline Phosphatase 11/15/2023 98  44 - 121 IU/L Final    AST (SGOT) 11/15/2023 27  0 - 40 IU/L Final    ALT (SGPT) 11/15/2023 17  0 - 32 IU/L Final    Total Cholesterol 11/15/2023 219 (H)  100 - 199 mg/dL Final    Triglycerides 11/15/2023 85  0 - 149 mg/dL Final    HDL Cholesterol 11/15/2023 67  >39 mg/dL Final    VLDL Cholesterol Doug 11/15/2023 15  5 - 40 mg/dL Final    LDL Chol Calc (NIH) 11/15/2023 137 (H)  0 - 99 mg/dL Final    Free T4 11/15/2023 0.88  0.82 - 1.77 ng/dL Final    TSH 11/15/2023 22.500 (H)  0.450 - 4.500 uIU/mL Final       Mental Status Exam:   Hygiene:   good  Cooperation:  Cooperative  Eye Contact:  Good  Psychomotor Behavior:  Appropriate  Mood:dysthymic  Affect:  Appropriate  Hopelessness: Denies  Speech:  Normal  Thought Process:  Goal directed  Thought Content:  Normal  Suicidal:  None  Homicidal:  None  Hallucinations:  None  Delusion:  None  Memory:  Intact  Orientation:  Person, Place, Time, and Situation  Reliability:  good  Insight:  Good  Judgement:  Good  Impulse Control:  Good    PHQ-9 Depression Screening  Little interest or pleasure in doing things? (P) 1-->several days    Feeling down, depressed, or hopeless? (P) 1-->several days   Trouble falling or staying asleep, or sleeping too much? (P) 1-->several days   Feeling tired or having little energy? (P) 1-->several days   Poor appetite or overeating? (P) 1-->several days   Feeling bad about yourself - or that you are a failure or have let yourself or your family down? (P) 2-->more than half the days   Trouble concentrating on things, such as reading the newspaper or watching television? (P) 2-->more than half the days   Moving or speaking so slowly that other people could have noticed? Or the opposite - being so fidgety or restless that you have been moving around a lot more than usual? (P) 2-->more than half the days   Thoughts that you would be better off dead, or of hurting yourself in some way? (P) 0-->not at all   PHQ-9 Total Score (P) 11   If you checked off any problems, how difficult have these problems made it for you to do your work, take care of things at home, or get along with other people? (P) somewhat difficult        Assessment/Plan:  Diagnoses and all orders for this visit:    1. Attention deficit hyperactivity disorder (ADHD), combined type (Primary)  -     Discontinue: lisdexamfetamine (Vyvanse) 40 MG capsule; Take 1 capsule by mouth Every Morning  Dispense: 30 capsule; Refill: 0    2. Dysthymia      Patient is doing much better on Vyvanse.  Mood is dependent on being around her family that is out of town or not.  Patient always feels much better when she is near them.    A psychological evaluation was conducted in order to assess past and current level of functioning. Areas assessed included, but were not limited to: perception of social support, perception of ability to face and deal with challenges in life (positive functioning), anxiety symptoms, depressive symptoms, perspective on beliefs/belief system, coping skills for stress, intelligence level,  Therapeutic rapport was established. Interventions conducted  today were geared towards incorporating medication management along with support for continued therapy. Education was also provided as to the med management with this provider and what to expect in subsequent sessions.    We discussed risks, benefits,goals and side effects of the above medication and the patient was agreeable with the plan.Patient was educated on the importance of compliance with treatment and follow-up appointments. Patient is aware to contact the [facility] [phone] with any worsening of symptoms. To call for questions or concerns and return early if necessary. Patent is agreeable to go to the Emergency Department or call 911 should they begin SI/HI.     Part of this note may be an electronic transcription/translation of spoken language to printed text using the Dragon Dictation System.    Return in about 2 months (around 9/9/2024) for Follow Up 30 min, Video visit.    TYLER Vasquez

## 2024-07-22 RX ORDER — LEVOTHYROXINE SODIUM 137 UG/1
TABLET ORAL
Qty: 30 TABLET | Refills: 0 | Status: SHIPPED | OUTPATIENT
Start: 2024-07-22

## 2024-07-23 NOTE — TELEPHONE ENCOUNTER
Called and spoke to patient and patient scheduled appointment for yearly wellness visit in August

## 2024-07-25 DIAGNOSIS — F90.2 ATTENTION DEFICIT HYPERACTIVITY DISORDER (ADHD), COMBINED TYPE: ICD-10-CM

## 2024-07-25 RX ORDER — LISDEXAMFETAMINE DIMESYLATE 40 MG/1
40 CAPSULE ORAL EVERY MORNING
Qty: 30 CAPSULE | Refills: 0 | Status: SHIPPED | OUTPATIENT
Start: 2024-07-25

## 2024-07-25 RX ORDER — ATOMOXETINE 18 MG/1
18 CAPSULE ORAL DAILY
Qty: 30 CAPSULE | Refills: 1 | Status: CANCELLED | OUTPATIENT
Start: 2024-07-25

## 2024-08-07 ENCOUNTER — OFFICE VISIT (OUTPATIENT)
Dept: FAMILY MEDICINE CLINIC | Facility: CLINIC | Age: 64
End: 2024-08-07
Payer: MEDICARE

## 2024-08-07 VITALS
DIASTOLIC BLOOD PRESSURE: 70 MMHG | SYSTOLIC BLOOD PRESSURE: 122 MMHG | BODY MASS INDEX: 20.66 KG/M2 | HEIGHT: 69 IN | HEART RATE: 70 BPM | WEIGHT: 139.5 LBS | OXYGEN SATURATION: 96 %

## 2024-08-07 DIAGNOSIS — E03.9 ACQUIRED HYPOTHYROIDISM: ICD-10-CM

## 2024-08-07 DIAGNOSIS — F90.2 ATTENTION DEFICIT HYPERACTIVITY DISORDER (ADHD), COMBINED TYPE: ICD-10-CM

## 2024-08-07 DIAGNOSIS — F31.60 BIPOLAR 1 DISORDER, MIXED: ICD-10-CM

## 2024-08-07 DIAGNOSIS — Z00.00 WELLNESS EXAMINATION: Primary | ICD-10-CM

## 2024-08-07 DIAGNOSIS — Z12.31 ENCOUNTER FOR SCREENING MAMMOGRAM FOR MALIGNANT NEOPLASM OF BREAST: ICD-10-CM

## 2024-08-07 DIAGNOSIS — Z78.0 POST-MENOPAUSAL: ICD-10-CM

## 2024-08-07 DIAGNOSIS — E78.2 MIXED HYPERLIPIDEMIA: ICD-10-CM

## 2024-08-07 NOTE — PROGRESS NOTES
Subjective   The ABCs of the Annual Wellness Visit  Medicare Wellness Visit      Celestina Sullivan is a 64 y.o. patient who presents for a Medicare Wellness Visit.    The following portions of the patient's history were reviewed and   updated as appropriate: allergies, current medications, past family history, past medical history, past social history, past surgical history, and problem list.    Compared to one year ago, the patient's physical   health is the same.  Compared to one year ago, the patient's mental   health is better.    Recent Hospitalizations:  She was not admitted to the hospital during the last year.     She states that she has been having trouble with her foot after dropping a rock on the foot. Se states that she has seen podiatry for this, and was told that it was over use, but nothing acute. She was offered a steroid injection as well as another more conservative therapy, and is doing well with this.       Current Medical Providers:  Patient Care Team:  Myah Ghosh DO as PCP - General (Family Medicine)  Francine Vickers APRN as Nurse Practitioner (Nurse Practitioner)    Outpatient Medications Prior to Visit   Medication Sig Dispense Refill    ibuprofen (ADVIL,MOTRIN) 800 MG tablet Take 1 tablet by mouth Every 6 (Six) Hours As Needed for Moderate Pain. 60 tablet 3    levothyroxine (SYNTHROID, LEVOTHROID) 137 MCG tablet TAKE 1 TABLET BY MOUTH DAILY 30 tablet 0    lisdexamfetamine (Vyvanse) 40 MG capsule Take 1 capsule by mouth Every Morning 30 capsule 0    rosuvastatin (CRESTOR) 10 MG tablet TAKE ONE TABLET BY MOUTH DAILY 90 tablet 1     No facility-administered medications prior to visit.     No opioid medication identified on active medication list. I have reviewed chart for other potential  high risk medication/s and harmful drug interactions in the elderly.      Aspirin is not on active medication list.  Aspirin use is not indicated based on review of current medical  "condition/s. Risk of harm outweighs potential benefits.  .    Patient Active Problem List   Diagnosis    Tobacco user    Mixed hyperlipidemia    Hypothyroidism    Dysthymia    Disorder of adrenal gland    Carpal tunnel syndrome    Attention deficit hyperactivity disorder     Advance Care Planning Advance Directive is not on file.  ACP discussion was held with the patient during this visit. Patient does not have an advance directive, declines further assistance.            Objective   Vitals:    24 1326   BP: 122/70   BP Location: Left arm   Patient Position: Sitting   Cuff Size: Adult   Pulse: 70   SpO2: 96%   Weight: 63.3 kg (139 lb 8 oz)   Height: 175.3 cm (69\")       Estimated body mass index is 20.6 kg/m² as calculated from the following:    Height as of this encounter: 175.3 cm (69\").    Weight as of this encounter: 63.3 kg (139 lb 8 oz).    BMI is within normal parameters. No other follow-up for BMI required.       Does the patient have evidence of cognitive impairment? No                                                                                               Health  Risk Assessment    Smoking Status:  Social History     Tobacco Use   Smoking Status Every Day    Current packs/day: 0.25    Average packs/day: 0.3 packs/day for 20.0 years (5.0 ttl pk-yrs)    Types: Cigarettes   Smokeless Tobacco Never     Alcohol Consumption:  Social History     Substance and Sexual Activity   Alcohol Use Not Currently       Fall Risk Screen  STEADI Fall Risk Assessment was completed, and patient is at LOW risk for falls.Assessment completed on:2024    Depression Screenin/7/2024     1:27 PM   PHQ-2/PHQ-9 Depression Screening   Little Interest or Pleasure in Doing Things 0-->not at all   Feeling Down, Depressed or Hopeless 0-->not at all   PHQ-9: Brief Depression Severity Measure Score 0     Health Habits and Functional and Cognitive Screenin/7/2024    11:30 AM   Functional & Cognitive Status   Do " you have difficulty preparing food and eating? No   Do you have difficulty bathing yourself, getting dressed or grooming yourself? No   Do you have difficulty using the toilet? No   Do you have difficulty moving around from place to place? Yes   Do you have trouble with steps or getting out of a bed or a chair? Yes   Current Diet Limited Junk Food   Dental Exam Other   Eye Exam Not up to date   Exercise (times per week) 5 times per week   Current Exercises Include Gardening;House Cleaning;Yard Work   Do you need help using the phone?  No   Are you deaf or do you have serious difficulty hearing?  No   Do you need help to go to places out of walking distance? No   Do you need help shopping? No   Do you need help preparing meals?  No   Do you need help with housework?  No   Do you need help with laundry? No   Do you need help taking your medications? No   Do you need help managing money? No   Do you ever drive or ride in a car without wearing a seat belt? No   Have you felt unusual stress, anger or loneliness in the last month? Yes   Who do you live with? Alone   If you need help, do you have trouble finding someone available to you? Yes   Have you been bothered in the last four weeks by sexual problems? No   Do you have difficulty concentrating, remembering or making decisions? Yes           Age-appropriate Screening Schedule:  Refer to the list below for future screening recommendations based on patient's age, sex and/or medical conditions. Orders for these recommended tests are listed in the plan section. The patient has been provided with a written plan.    Health Maintenance List  Health Maintenance   Topic Date Due    DXA SCAN  Never done    Pneumococcal Vaccine 65+ (1 of 2 - PCV) Never done    INFLUENZA VACCINE  08/01/2024    TDAP/TD VACCINES (1 - Tdap) 08/07/2024 (Originally [birth year]/26/1979)    ZOSTER VACCINE (1 of 2) 08/07/2024 (Originally 1/26/2009)    COVID-19 Vaccine (1 - 2023-24 season) 10/27/2024 (Originally  9/1/2023)    LIPID PANEL  11/15/2024    MAMMOGRAM  05/10/2025    ANNUAL WELLNESS VISIT  08/07/2025    COLORECTAL CANCER SCREENING  04/11/2026    HEPATITIS C SCREENING  Completed                                                                                                                                                Physical Exam  Constitutional:       General: She is not in acute distress.     Appearance: She is not ill-appearing or toxic-appearing.   HENT:      Head: Normocephalic and atraumatic.   Pulmonary:      Effort: Pulmonary effort is normal. No respiratory distress.   Musculoskeletal:      Right lower leg: No edema.      Left lower leg: No edema.      Comments: Gait normal   Neurological:      General: No focal deficit present.      Mental Status: She is alert and oriented to person, place, and time.   Psychiatric:         Mood and Affect: Mood normal.         Thought Content: Thought content normal.          CMS Preventative Services Quick Reference  Risk Factors Identified During Encounter  None Identified    The above risks/problems have been discussed with the patient.  Pertinent information has been shared with the patient in the After Visit Summary.  An After Visit Summary and PPPS were made available to the patient.    Follow Up:   Next Medicare Wellness visit to be scheduled in 1 year.     Assessment & Plan  Wellness examination    Bipolar 1 disorder, mixed    Attention deficit hyperactivity disorder (ADHD), combined type    Acquired hypothyroidism    Mixed hyperlipidemia     Post-menopausal    Encounter for screening mammogram for malignant neoplasm of breast      Orders Placed This Encounter   Procedures    Mammo Screening Bilateral With CAD    DEXA Bone Density Axial    Comprehensive Metabolic Panel    Lipid Panel    TSH    T4, Free    CBC & Differential         Blood work ordered and will contact with results when available. Will adjust medications based on abnormalities seen.     Mammogram  and Dexa scan ordered.     Medication refills not needed at this time.     Past medical and surgical history as well as allergies, family history and social history were reviewed, and discussed with patient.  Chronic conditions were reviewed as well as medications.   Anticipatory guidance handouts including healthy diet, health maintenance, as well as regular exercise and general instructions were given via [employer], and patient was able to ask questions and discuss any concerns.        Follow Up:   No follow-ups on file.

## 2024-08-26 RX ORDER — LEVOTHYROXINE SODIUM 137 UG/1
TABLET ORAL
Qty: 30 TABLET | Refills: 0 | Status: SHIPPED | OUTPATIENT
Start: 2024-08-26

## 2024-09-04 ENCOUNTER — TELEMEDICINE (OUTPATIENT)
Dept: PSYCHIATRY | Facility: CLINIC | Age: 64
End: 2024-09-04
Payer: MEDICARE

## 2024-09-04 DIAGNOSIS — F90.2 ATTENTION DEFICIT HYPERACTIVITY DISORDER (ADHD), COMBINED TYPE: Primary | ICD-10-CM

## 2024-09-04 DIAGNOSIS — F34.1 DYSTHYMIA: ICD-10-CM

## 2024-09-04 PROCEDURE — 1159F MED LIST DOCD IN RCRD: CPT | Performed by: NURSE PRACTITIONER

## 2024-09-04 PROCEDURE — 99214 OFFICE O/P EST MOD 30 MIN: CPT | Performed by: NURSE PRACTITIONER

## 2024-09-04 PROCEDURE — 1160F RVW MEDS BY RX/DR IN RCRD: CPT | Performed by: NURSE PRACTITIONER

## 2024-09-04 PROCEDURE — 96127 BRIEF EMOTIONAL/BEHAV ASSMT: CPT | Performed by: NURSE PRACTITIONER

## 2024-09-04 RX ORDER — LISDEXAMFETAMINE DIMESYLATE 40 MG/1
40 CAPSULE ORAL EVERY MORNING
Qty: 30 CAPSULE | Refills: 0 | Status: SHIPPED | OUTPATIENT
Start: 2024-09-04

## 2024-09-04 RX ORDER — IBUPROFEN 800 MG/1
800 TABLET, FILM COATED ORAL EVERY 6 HOURS PRN
Qty: 60 TABLET | Refills: 3 | Status: SHIPPED | OUTPATIENT
Start: 2024-09-04

## 2024-09-09 ENCOUNTER — TELEPHONE (OUTPATIENT)
Dept: PSYCHIATRY | Facility: CLINIC | Age: 64
End: 2024-09-09
Payer: MEDICARE

## 2024-09-09 NOTE — TELEPHONE ENCOUNTER
Information was not correctly faxed the first time and patient does not have an appointment set up yet. I called [facility] to confirm their fax number. Faxed over information to [facility] located at   [facility] [facility] [address], [address] [place] [address]    Phone: [phone]  Fax:[phone]

## 2024-10-02 RX ORDER — LEVOTHYROXINE SODIUM 137 UG/1
TABLET ORAL
Qty: 30 TABLET | Refills: 5 | Status: SHIPPED | OUTPATIENT
Start: 2024-10-02

## 2024-10-03 ENCOUNTER — TELEPHONE (OUTPATIENT)
Dept: FAMILY MEDICINE CLINIC | Facility: CLINIC | Age: 64
End: 2024-10-03

## 2024-10-23 DIAGNOSIS — F90.2 ATTENTION DEFICIT HYPERACTIVITY DISORDER (ADHD), COMBINED TYPE: ICD-10-CM

## 2024-10-24 RX ORDER — LISDEXAMFETAMINE DIMESYLATE 40 MG/1
40 CAPSULE ORAL EVERY MORNING
Qty: 30 CAPSULE | Refills: 0 | Status: SHIPPED | OUTPATIENT
Start: 2024-10-24

## 2024-11-21 ENCOUNTER — OFFICE VISIT (OUTPATIENT)
Dept: FAMILY MEDICINE CLINIC | Facility: CLINIC | Age: 64
End: 2024-11-21
Payer: MEDICARE

## 2024-11-21 VITALS
HEIGHT: 69 IN | OXYGEN SATURATION: 99 % | BODY MASS INDEX: 21.92 KG/M2 | WEIGHT: 148 LBS | HEART RATE: 102 BPM | DIASTOLIC BLOOD PRESSURE: 88 MMHG | SYSTOLIC BLOOD PRESSURE: 160 MMHG

## 2024-11-21 DIAGNOSIS — M25.551 RIGHT HIP PAIN: Primary | ICD-10-CM

## 2024-11-21 DIAGNOSIS — G89.29 CHRONIC NECK PAIN: ICD-10-CM

## 2024-11-21 DIAGNOSIS — M54.2 CHRONIC NECK PAIN: ICD-10-CM

## 2024-11-21 PROCEDURE — 99213 OFFICE O/P EST LOW 20 MIN: CPT | Performed by: STUDENT IN AN ORGANIZED HEALTH CARE EDUCATION/TRAINING PROGRAM

## 2024-11-21 NOTE — PROGRESS NOTES
"Chief Complaint  Foot Pain and Back Pain    Subjective          Celestina Sullivan presents to [facility]  History of Present Illness    Patient states that she has been having more trouble with her back and neck. She states that she went to see a chiropractor and was told that she has some abnormalities with \" a right lateral mass on the right lateral dens and  left lateral dens\" It was recommended that she have further evaluation with complete Xrays of the neck as well as possible age related abnormalities in the cervical and lumbar spine. She states that she has been having more trouble with neck pain and would like to discuss having this evaluated at this time.     She also states that she has been seen by podiatry for a foot injury. She states that she was told that she had a     Objective   Vital Signs:   /88   Pulse 102   Ht 175.3 cm (69\")   Wt 67.1 kg (148 lb)   SpO2 99%   BMI 21.86 kg/m²     Body mass index is 21.86 kg/m².    Review of Systems    Past History:  Medical History: has a past medical history of ADHD (attention deficit hyperactivity disorder) (2010), Anxiety, Arthritis, Fibromyalgia, primary, History of medical problems, Hyperlipidemia, Hypothyroidism, Kidney stone, Low back pain, Neurotic depression, Osteopenia, Substance abuse (2005), and Urinary tract infection.   Surgical History: has a past surgical history that includes Joint replacement; Breast surgery (2000); and Tubal ligation.   Family History: family history includes Anxiety disorder in her mother; Cancer in her mother; Depression in her mother.   Social History: reports that she has been smoking cigarettes. She has a 5 pack-year smoking history. She has never used smokeless tobacco. She reports that she does not currently use alcohol. She reports that she does not currently use drugs after having used the following drugs: Methamphetamines.      Current Outpatient Medications:     ibuprofen " (ADVIL,MOTRIN) 800 MG tablet, Take 1 tablet by mouth Every 6 (Six) Hours As Needed for Moderate Pain., Disp: 60 tablet, Rfl: 3    levothyroxine (SYNTHROID, LEVOTHROID) 137 MCG tablet, TAKE 1 TABLET BY MOUTH DAILY, Disp: 30 tablet, Rfl: 5    lisdexamfetamine (Vyvanse) 40 MG capsule, Take 1 capsule by mouth Every Morning, Disp: 30 capsule, Rfl: 0    rosuvastatin (CRESTOR) 10 MG tablet, TAKE ONE TABLET BY MOUTH DAILY, Disp: 90 tablet, Rfl: 1    Allergies: Patient has no known allergies.    Physical Exam  Constitutional:       General: She is not in acute distress.     Appearance: She is not ill-appearing or toxic-appearing.   HENT:      Head: Normocephalic and atraumatic.   Cardiovascular:      Rate and Rhythm: Normal rate and regular rhythm.      Heart sounds: No murmur heard.  Pulmonary:      Effort: Pulmonary effort is normal. No respiratory distress.   Musculoskeletal:      Right lower leg: No edema.      Left lower leg: No edema.   Neurological:      General: No focal deficit present.      Mental Status: She is alert and oriented to person, place, and time.   Psychiatric:         Mood and Affect: Mood normal.         Thought Content: Thought content normal.          Result Review :                   Assessment and Plan    Diagnoses and all orders for this visit:    1. Right hip pain (Primary)  -     XR Hip With or Without Pelvis 2 - 3 View Right    2. Chronic neck pain  -     XR Spine Cervical Complete 4 or 5 View (In Office)    Discussed results from patient's chiropractor and will have her do records releases to get a better idea of these.  Recommend that we do x-rays of the neck as well as the hip in order to make sure that there are not any significant abnormalities.  After we have x-ray results we will be able to move forward with advanced imaging if necessary.    Follow Up   No follow-ups on file.  Patient was given instructions and counseling regarding her condition or for health maintenance advice. Please  see specific information pulled into the AVS if appropriate.     Myah Ghosh, DO

## 2024-12-02 ENCOUNTER — TELEPHONE (OUTPATIENT)
Dept: FAMILY MEDICINE CLINIC | Facility: CLINIC | Age: 64
End: 2024-12-02
Payer: MEDICARE

## 2024-12-02 DIAGNOSIS — G89.29 CHRONIC NECK PAIN: ICD-10-CM

## 2024-12-02 DIAGNOSIS — M54.2 CHRONIC NECK PAIN: ICD-10-CM

## 2024-12-02 DIAGNOSIS — M54.9 MID BACK PAIN: ICD-10-CM

## 2024-12-02 DIAGNOSIS — M25.551 RIGHT HIP PAIN: Primary | ICD-10-CM

## 2024-12-02 DIAGNOSIS — Z79.899 HIGH RISK MEDICATION USE: Primary | ICD-10-CM

## 2024-12-02 DIAGNOSIS — F90.2 ATTENTION DEFICIT HYPERACTIVITY DISORDER (ADHD), COMBINED TYPE: ICD-10-CM

## 2024-12-02 RX ORDER — LISDEXAMFETAMINE DIMESYLATE 40 MG/1
40 CAPSULE ORAL EVERY MORNING
Qty: 30 CAPSULE | Refills: 0 | OUTPATIENT
Start: 2024-12-02

## 2024-12-02 NOTE — TELEPHONE ENCOUNTER
Caller: Nate Celestina CHEIKH    Relationship: Self    Best call back number: [phone]     What is the medical concern/diagnosis: THE PATIENT REPORTS TO PLEASE SEE DIAGNOSIS FROM MOST RECENT X-RAY OF HER PELVIC, CERVICAL AND LUMBAR REGION     What specialty or service is being requested: PHYSICAL THERAPY     What is the provider, practice or medical service name: [facility]    What is the office location: [address] [place] [address]    What is the office phone number: [phone]    Any additional details: THE PATIENT STATED THAT THE RESULTS OF HER RECENT X-RAY STATED THAT PHYSICAL THERAPY IS RECOMMENDED PRIOR TO HAVING AN MRI, AND IS REQUESTING A REFERRAL TO PHYSICAL THERAPY.    PLEASE CALL THE PATIENT AND ADVISE.

## 2024-12-20 ENCOUNTER — LAB (OUTPATIENT)
Dept: FAMILY MEDICINE CLINIC | Facility: CLINIC | Age: 64
End: 2024-12-20
Payer: MEDICARE

## 2024-12-20 ENCOUNTER — CLINICAL SUPPORT (OUTPATIENT)
Dept: FAMILY MEDICINE CLINIC | Facility: CLINIC | Age: 64
End: 2024-12-20
Payer: MEDICARE

## 2024-12-20 DIAGNOSIS — Z79.899 HIGH RISK MEDICATION USE: Primary | ICD-10-CM

## 2024-12-20 LAB
AMPHET+METHAMPHET UR QL: POSITIVE
AMPHETAMINE INTERNAL CONTROL: ABNORMAL
AMPHETAMINES UR QL: NEGATIVE
BARBITURATE INTERNAL CONTROL: ABNORMAL
BARBITURATES UR QL SCN: NEGATIVE
BENZODIAZ UR QL SCN: NEGATIVE
BENZODIAZEPINE INTERNAL CONTROL: ABNORMAL
BUPRENORPHINE INTERNAL CONTROL: ABNORMAL
BUPRENORPHINE SERPL-MCNC: NEGATIVE NG/ML
CANNABINOIDS SERPL QL: NEGATIVE
COCAINE INTERNAL CONTROL: ABNORMAL
COCAINE UR QL: NEGATIVE
EDDP INTERNAL CONTROL: ABNORMAL
EDDP UR QL SCN: NEGATIVE
MDMA (ECSTASY) INTERNAL CONTROL: ABNORMAL
MDMA UR QL SCN: NEGATIVE
METHADONE INTERNAL CONTROL: ABNORMAL
METHADONE UR QL SCN: NEGATIVE
METHAMPHETAMINE INTERNAL CONTROL: ABNORMAL
MORPHINE INTERNAL CONTROL: ABNORMAL
MORPHINE UR QL SCN: NEGATIVE
OXYCODONE INTERNAL CONTROL: ABNORMAL
OXYCODONE UR QL SCN: NEGATIVE
PCP UR QL SCN: NEGATIVE
PHENCYCLIDINE INTERNAL CONTROL: ABNORMAL
PROPOXYPH UR QL SCN: NEGATIVE
PROPOXYPHENE INTERNAL CONTROL: ABNORMAL
THC INTERNAL CONTROL: ABNORMAL
TRICYCLIC ANTIDEPRESSANTS INTERNAL CONTROL: ABNORMAL
TRICYCLICS UR QL SCN: NEGATIVE

## 2024-12-21 LAB
ALBUMIN SERPL-MCNC: 4.7 G/DL (ref 3.9–4.9)
ALP SERPL-CCNC: 118 IU/L (ref 44–121)
ALT SERPL-CCNC: 12 IU/L (ref 0–32)
AST SERPL-CCNC: 18 IU/L (ref 0–40)
BASOPHILS # BLD AUTO: 0.1 X10E3/UL (ref 0–0.2)
BASOPHILS NFR BLD AUTO: 1 %
BILIRUB SERPL-MCNC: 0.3 MG/DL (ref 0–1.2)
BUN SERPL-MCNC: 13 MG/DL (ref 8–27)
BUN/CREAT SERPL: 17 (ref 12–28)
CALCIUM SERPL-MCNC: 9.8 MG/DL (ref 8.7–10.3)
CHLORIDE SERPL-SCNC: 96 MMOL/L (ref 96–106)
CHOLEST SERPL-MCNC: 250 MG/DL (ref 100–199)
CO2 SERPL-SCNC: 26 MMOL/L (ref 20–29)
CREAT SERPL-MCNC: 0.76 MG/DL (ref 0.57–1)
EGFRCR SERPLBLD CKD-EPI 2021: 87 ML/MIN/1.73
EOSINOPHIL # BLD AUTO: 0.4 X10E3/UL (ref 0–0.4)
EOSINOPHIL NFR BLD AUTO: 5 %
ERYTHROCYTE [DISTWIDTH] IN BLOOD BY AUTOMATED COUNT: 12.2 % (ref 11.7–15.4)
GLOBULIN SER CALC-MCNC: 2.6 G/DL (ref 1.5–4.5)
GLUCOSE SERPL-MCNC: 87 MG/DL (ref 70–99)
HCT VFR BLD AUTO: 44 % (ref 34–46.6)
HDLC SERPL-MCNC: 53 MG/DL
HGB BLD-MCNC: 14.4 G/DL (ref 11.1–15.9)
IMM GRANULOCYTES # BLD AUTO: 0 X10E3/UL (ref 0–0.1)
IMM GRANULOCYTES NFR BLD AUTO: 0 %
LDLC SERPL CALC-MCNC: 158 MG/DL (ref 0–99)
LYMPHOCYTES # BLD AUTO: 2.3 X10E3/UL (ref 0.7–3.1)
LYMPHOCYTES NFR BLD AUTO: 30 %
MCH RBC QN AUTO: 30.8 PG (ref 26.6–33)
MCHC RBC AUTO-ENTMCNC: 32.7 G/DL (ref 31.5–35.7)
MCV RBC AUTO: 94 FL (ref 79–97)
MONOCYTES # BLD AUTO: 0.7 X10E3/UL (ref 0.1–0.9)
MONOCYTES NFR BLD AUTO: 10 %
NEUTROPHILS # BLD AUTO: 4.1 X10E3/UL (ref 1.4–7)
NEUTROPHILS NFR BLD AUTO: 54 %
PLATELET # BLD AUTO: 314 X10E3/UL (ref 150–450)
POTASSIUM SERPL-SCNC: 4.1 MMOL/L (ref 3.5–5.2)
PROT SERPL-MCNC: 7.3 G/DL (ref 6–8.5)
RBC # BLD AUTO: 4.67 X10E6/UL (ref 3.77–5.28)
SODIUM SERPL-SCNC: 138 MMOL/L (ref 134–144)
T4 FREE SERPL-MCNC: 1.24 NG/DL (ref 0.82–1.77)
TRIGL SERPL-MCNC: 213 MG/DL (ref 0–149)
TSH SERPL DL<=0.005 MIU/L-ACNC: 4.83 UIU/ML (ref 0.45–4.5)
VLDLC SERPL CALC-MCNC: 39 MG/DL (ref 5–40)
WBC # BLD AUTO: 7.6 X10E3/UL (ref 3.4–10.8)

## 2024-12-27 DIAGNOSIS — F90.2 ATTENTION DEFICIT HYPERACTIVITY DISORDER (ADHD), COMBINED TYPE: ICD-10-CM

## 2024-12-30 RX ORDER — LISDEXAMFETAMINE DIMESYLATE 40 MG/1
40 CAPSULE ORAL EVERY MORNING
Qty: 30 CAPSULE | Refills: 0 | Status: SHIPPED | OUTPATIENT
Start: 2024-12-30

## 2025-01-16 ENCOUNTER — TELEMEDICINE (OUTPATIENT)
Dept: PSYCHIATRY | Facility: CLINIC | Age: 65
End: 2025-01-16
Payer: MEDICARE

## 2025-01-16 DIAGNOSIS — F90.2 ATTENTION DEFICIT HYPERACTIVITY DISORDER (ADHD), COMBINED TYPE: Primary | ICD-10-CM

## 2025-01-16 RX ORDER — DEXTROAMPHETAMINE SACCHARATE, AMPHETAMINE ASPARTATE, DEXTROAMPHETAMINE SULFATE AND AMPHETAMINE SULFATE 5; 5; 5; 5 MG/1; MG/1; MG/1; MG/1
TABLET ORAL
Qty: 60 TABLET | Refills: 0 | Status: SHIPPED | OUTPATIENT
Start: 2025-01-16

## 2025-01-16 NOTE — PROGRESS NOTES
Patient Name: Celestina Sullivan  MRN: [id number]   :  1960     This provider is located at her home office through the [facility] (through [facility]), [address], [address] using a secure [place] [employer] Video Visit through [employer]. Patient is being seen remotely via telehealth at their home address in [place], and stated they are in a secure environment for this session. The patient's condition being diagnosed/treated is appropriate for telemedicine. The provider identified herself as well as her credentials.   The patient, and/or patients guardian, consent to be seen remotely, and when consent is given they understand that the consent allows for patient identifiable information to be sent to a third party as needed.   They may refuse to be seen remotely at any time. The electronic data is encrypted and password protected, and the patient and/or guardian has been advised of the potential risks to privacy not withstanding such measures.    You have chosen to receive care through a telehealth visit.  Do you consent to use a video/audio connection for your medical care today? Yes    Chief Complaint:      ICD-10-CM ICD-9-CM   1. Attention deficit hyperactivity disorder (ADHD), combined type  F90.2 314.01       History of Present Illness: Celestina Sullivan is a 65 y.o. female is here today for medication management follow up.  Patient states she is not sleeping with Vyvanse.  Her heart rate is elevated and it flutters.  Would like to go back on Adderall.  Does have some dysthymia however does not want medication for this at this point.    The following portions of the patient's history were reviewed and updated as appropriate: allergies, current medications, past family history, past medical history, past social history, past surgical history, and problem list.    Review of Systems;;  Review of Systems   Constitutional:  Negative for activity change, appetite change,  fatigue, unexpected weight gain and unexpected weight loss.   Respiratory:  Negative for shortness of breath and wheezing.    Gastrointestinal:  Negative for constipation, diarrhea, nausea and vomiting.   Musculoskeletal:  Negative for gait problem.   Skin:  Negative for dry skin and rash.   Neurological:  Negative for dizziness, speech difficulty, weakness, light-headedness, headache, memory problem and confusion.   Psychiatric/Behavioral:  Negative for agitation, behavioral problems, decreased concentration, dysphoric mood, hallucinations, self-injury, sleep disturbance, suicidal ideas, negative for hyperactivity, depressed mood and stress. The patient is not nervous/anxious.        Physical Exam;;  Physical Exam  Constitutional:       General: She is not in acute distress.     Appearance: She is well-developed. She is not diaphoretic.   HENT:      Head: Normocephalic and atraumatic.   Eyes:      Conjunctiva/sclera: Conjunctivae normal.   Pulmonary:      Effort: Pulmonary effort is normal. No respiratory distress.   Musculoskeletal:         General: Normal range of motion.      Cervical back: Full passive range of motion without pain and normal range of motion.   Neurological:      Mental Status: She is alert and oriented to person, place, and time.   Psychiatric:         Mood and Affect: Mood is not anxious or depressed. Affect is not labile, blunt, angry or inappropriate.         Speech: Speech is not rapid and pressured or tangential.         Behavior: Behavior normal. Behavior is not agitated, slowed, aggressive, withdrawn, hyperactive or combative. Behavior is cooperative.         Thought Content: Thought content normal. Thought content is not paranoid or delusional. Thought content does not include homicidal or suicidal ideation. Thought content does not include homicidal or suicidal plan.         Judgment: Judgment normal.       There were no vitals taken for this visit.  There is no height or weight on  file to calculate BMI. Video appt unable to obtain.     Current Medications;;    Current Outpatient Medications:     amphetamine-dextroamphetamine (Adderall) 20 MG tablet, Take 20 mg orally every morning and afternoon., Disp: 60 tablet, Rfl: 0    ibuprofen (ADVIL,MOTRIN) 800 MG tablet, Take 1 tablet by mouth Every 6 (Six) Hours As Needed for Moderate Pain., Disp: 60 tablet, Rfl: 3    levothyroxine (SYNTHROID, LEVOTHROID) 137 MCG tablet, TAKE 1 TABLET BY MOUTH DAILY, Disp: 30 tablet, Rfl: 5    rosuvastatin (CRESTOR) 10 MG tablet, TAKE ONE TABLET BY MOUTH DAILY, Disp: 90 tablet, Rfl: 1    Lab Results:   Clinical Support on 12/20/2024   Component Date Value Ref Range Status    Amphetamine Screen, Urine 12/20/2024 Positive (A)  Negative Final    AMP INTERNAL CONTROL 12/20/2024 Passed  Passed Final    Barbiturates Screen, Urine 12/20/2024 Negative  Negative Final    BARBITURATE INTERNAL CONTROL 12/20/2024 Passed  Passed Final    Benzodiazepine Screen, Urine 12/20/2024 Negative  Negative Final    BENZODIAZEPINE INTERNAL CONTROL 12/20/2024 Passed  Passed Final    Buprenorphine, Screen, Urine 12/20/2024 Negative  Negative Final    BUPRENORPHINE INTERNAL CONTROL 12/20/2024 Passed  Passed Final    Cocaine Screen, Urine 12/20/2024 Negative  Negative Final    COCAINE INTERNAL CONTROL 12/20/2024 Passed  Passed Final    MDMA (ECSTASY) 12/20/2024 Negative  Negative Final    MDMA (ECSTASY) INTERNAL CONTROL 12/20/2024 Passed  Passed Final    EDDP Screen, Urine 12/20/2024 Negative  Negative Final    EDDP INTERNAL CONTROL 12/20/2024 Passed  Passed Final    Methadone Screen, Urine 12/20/2024 Negative  Negative Final    METHADONE INTERNAL CONTROL 12/20/2024 Passed  Passed Final    Methamphetamine, Ur 12/20/2024 Negative  Negative Final    METHAMPHETAMINE INTERNAL CONTROL 12/20/2024 Passed  Passed Final    Morphine Screen, Urine 12/20/2024 Negative  Negative Final    MOR INTERNAL CONTROL 12/20/2024 Passed  Passed Final    Oxycodone  Screen, Urine 12/20/2024 Negative  Negative Final    OXYCODONE INTERNAL CONTROL 12/20/2024 Passed  Passed Final    Phencyclidine (PCP), Urine 12/20/2024 Negative  Negative Final    PHENCYCLIDINE INTERNAL CONTROL 12/20/2024 Passed  Passed Final    Propoxyphene Scree, Urine 12/20/2024 Negative  Negative Final    PPX INTERNAL CONTROL 12/20/2024 Passed  Passed Final    THC, Screen, Urine 12/20/2024 Negative  Negative Final    THC INTERNAL CONTROL 12/20/2024 Passed  Passed Final    Tricyclic Antidepressants Screen 12/20/2024 Negative  Negative Final    TCA INTERNAL CONTROL 12/20/2024 Passed  Passed Final    lot:[id number]  exp:6-10-26       Mental Status Exam:   Hygiene:   good  Cooperation:  Cooperative  Eye Contact:  Good  Psychomotor Behavior:  Appropriate  Mood:dysthymic  Affect:  Appropriate  Hopelessness: Denies  Speech:  Normal  Thought Process:  Goal directed  Thought Content:  Normal  Suicidal:  None  Homicidal:  None  Hallucinations:  None  Delusion:  None  Memory:  Intact  Orientation:  Person, Place, Time, and Situation  Reliability:  good  Insight:  Good  Judgement:  Good  Impulse Control:  Good    PHQ-9 Depression Screening  Little interest or pleasure in doing things? (Patient-Rptd) Several days   Feeling down, depressed, or hopeless? (Patient-Rptd) Several days   PHQ-2 Total Score (Patient-Rptd) 2   Trouble falling or staying asleep, or sleeping too much? (Patient-Rptd) Over half   Feeling tired or having little energy? (Patient-Rptd) Several days   Poor appetite or overeating? (Patient-Rptd) Over half   Feeling bad about yourself - or that you are a failure or have let yourself or your family down? (Patient-Rptd) Several days   Trouble concentrating on things, such as reading the newspaper or watching television? (Patient-Rptd) Several days   Moving or speaking so slowly that other people could have noticed? Or the opposite - being so fidgety or restless that you have been moving around a lot more than  usual? (Patient-Rptd) Over half   Thoughts that you would be better off dead, or of hurting yourself in some way? (Patient-Rptd) Not at all   PHQ-9 Total Score (Patient-Rptd) 11   If you checked off any problems, how difficult have these problems made it for you to do your work, take care of things at home, or get along with other people? (Patient-Rptd) Somewhat difficult     REVIEWED EDUARDO # [id number]       Assessment/Plan:  Diagnoses and all orders for this visit:    1. Attention deficit hyperactivity disorder (ADHD), combined type (Primary)  -     amphetamine-dextroamphetamine (Adderall) 20 MG tablet; Take 20 mg orally every morning and afternoon.  Dispense: 60 tablet; Refill: 0      Patient did not like Vyvanse.  We will go back to Adderall 20 mg twice a day.  We will follow-up in 2 months.    A psychological evaluation was conducted in order to assess past and current level of functioning. Areas assessed included, but were not limited to: perception of social support, perception of ability to face and deal with challenges in life (positive functioning), anxiety symptoms, depressive symptoms, perspective on beliefs/belief system, coping skills for stress, intelligence level,  Therapeutic rapport was established. Interventions conducted today were geared towards incorporating medication management along with support for continued therapy. Education was also provided as to the med management with this provider and what to expect in subsequent sessions.    We discussed risks, benefits,goals and side effects of the above medication and the patient was agreeable with the plan.Patient was educated on the importance of compliance with treatment and follow-up appointments. Patient is aware to contact the [facility] [phone] with any worsening of symptoms. To call for questions or concerns and return early if necessary. Patent is agreeable to go to the Emergency Department or call [phone]  should they begin SI/HI.     Part of this note may be an electronic transcription/translation of spoken language to printed text using the Dragon Dictation System.    Return in about 2 months (around 3/16/2025) for Follow Up 30 min, Video visit.    Francine Vickers, APRN

## 2025-02-24 DIAGNOSIS — F90.2 ATTENTION DEFICIT HYPERACTIVITY DISORDER (ADHD), COMBINED TYPE: ICD-10-CM

## 2025-02-24 RX ORDER — DEXTROAMPHETAMINE SACCHARATE, AMPHETAMINE ASPARTATE, DEXTROAMPHETAMINE SULFATE AND AMPHETAMINE SULFATE 5; 5; 5; 5 MG/1; MG/1; MG/1; MG/1
TABLET ORAL
Qty: 60 TABLET | Refills: 0 | Status: SHIPPED | OUTPATIENT
Start: 2025-02-24

## 2025-03-13 ENCOUNTER — TELEMEDICINE (OUTPATIENT)
Dept: PSYCHIATRY | Facility: CLINIC | Age: 65
End: 2025-03-13
Payer: MEDICARE

## 2025-03-13 DIAGNOSIS — F34.1 DYSTHYMIA: ICD-10-CM

## 2025-03-13 DIAGNOSIS — F90.2 ATTENTION DEFICIT HYPERACTIVITY DISORDER (ADHD), COMBINED TYPE: Primary | ICD-10-CM

## 2025-03-13 RX ORDER — DEXTROAMPHETAMINE SACCHARATE, AMPHETAMINE ASPARTATE, DEXTROAMPHETAMINE SULFATE AND AMPHETAMINE SULFATE 5; 5; 5; 5 MG/1; MG/1; MG/1; MG/1
TABLET ORAL
Qty: 60 TABLET | Refills: 0 | Status: SHIPPED | OUTPATIENT
Start: 2025-03-13
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
## 2025-04-07 DIAGNOSIS — F90.2 ATTENTION DEFICIT HYPERACTIVITY DISORDER (ADHD), COMBINED TYPE: ICD-10-CM

## 2025-04-07 RX ORDER — DEXTROAMPHETAMINE SACCHARATE, AMPHETAMINE ASPARTATE, DEXTROAMPHETAMINE SULFATE AND AMPHETAMINE SULFATE 5; 5; 5; 5 MG/1; MG/1; MG/1; MG/1
TABLET ORAL
Qty: 60 TABLET | Refills: 0 | Status: SHIPPED | OUTPATIENT
Start: 2025-04-07

## 2025-04-07 RX ORDER — LEVOTHYROXINE SODIUM 137 UG/1
137 TABLET ORAL DAILY
Qty: 90 TABLET | Refills: 0 | Status: SHIPPED | OUTPATIENT
Start: 2025-04-07

## 2025-04-15 RX ORDER — IBUPROFEN 800 MG/1
800 TABLET, FILM COATED ORAL EVERY 6 HOURS PRN
Qty: 60 TABLET | Refills: 1 | Status: SHIPPED | OUTPATIENT
Start: 2025-04-15
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
## 2025-05-08 DIAGNOSIS — F90.2 ATTENTION DEFICIT HYPERACTIVITY DISORDER (ADHD), COMBINED TYPE: ICD-10-CM

## 2025-05-08 RX ORDER — DEXTROAMPHETAMINE SACCHARATE, AMPHETAMINE ASPARTATE, DEXTROAMPHETAMINE SULFATE AND AMPHETAMINE SULFATE 5; 5; 5; 5 MG/1; MG/1; MG/1; MG/1
TABLET ORAL
Qty: 60 TABLET | Refills: 0 | Status: SHIPPED | OUTPATIENT
Start: 2025-05-08

## 2025-06-10 DIAGNOSIS — F90.2 ATTENTION DEFICIT HYPERACTIVITY DISORDER (ADHD), COMBINED TYPE: ICD-10-CM

## 2025-06-10 RX ORDER — DEXTROAMPHETAMINE SACCHARATE, AMPHETAMINE ASPARTATE, DEXTROAMPHETAMINE SULFATE AND AMPHETAMINE SULFATE 5; 5; 5; 5 MG/1; MG/1; MG/1; MG/1
TABLET ORAL
Qty: 60 TABLET | Refills: 0 | Status: SHIPPED | OUTPATIENT
Start: 2025-06-10

## 2025-06-18 ENCOUNTER — TELEMEDICINE (OUTPATIENT)
Dept: PSYCHIATRY | Facility: CLINIC | Age: 65
End: 2025-06-18
Payer: MEDICARE

## 2025-06-18 DIAGNOSIS — F90.2 ATTENTION DEFICIT HYPERACTIVITY DISORDER (ADHD), COMBINED TYPE: Primary | ICD-10-CM

## 2025-06-18 DIAGNOSIS — F34.1 DYSTHYMIA: ICD-10-CM

## 2025-06-18 NOTE — PROGRESS NOTES
Patient Name: Celestina Sullivna  MRN: [id number]   :  1960     This provider is located at her home office through the [facility] (through [facility]), [address], [address] using a secure [employer] Video Visit through [employer]. Patient is being seen remotely via telehealth at their home address in [place], and stated they are in a secure environment for this session. The patient's condition being diagnosed/treated is appropriate for telemedicine. The provider identified herself as well as her credentials.   The patient, and/or patients guardian, consent to be seen remotely, and when consent is given they understand that the consent allows for patient identifiable information to be sent to a third party as needed.   They may refuse to be seen remotely at any time. The electronic data is encrypted and password protected, and the patient and/or guardian has been advised of the potential risks to privacy not withstanding such measures.    You have chosen to receive care through a telehealth visit.  Do you consent to use a video/audio connection for your medical care today? Yes    Chief Complaint:      ICD-10-CM ICD-9-CM   1. Attention deficit hyperactivity disorder (ADHD), combined type  F90.2 314.01   2. Dysthymia  F34.1 300.4       History of Present Illness: Celestina Sullivan is a 65 y.o. female is here today for medication management follow up.  Patient [place] having a very [place]  People have been very kind and have been helping her however it is still a huge loss and patient is grieving this.  Feels medication is working well.  Does not want any medication for depression or anxiety.    The following portions of the patient's history were reviewed and updated as appropriate: allergies, current medications, past family history, past medical history, past social history, past surgical  history, and problem list.    Review of Systems;;  Review of Systems   Constitutional:  Negative for activity change, appetite change, fatigue, unexpected weight gain and unexpected weight loss.   Respiratory:  Negative for shortness of breath and wheezing.    Gastrointestinal:  Negative for constipation, diarrhea, nausea and vomiting.   Musculoskeletal:  Negative for gait problem.   Skin:  Negative for dry skin and rash.   Neurological:  Negative for dizziness, speech difficulty, weakness, light-headedness, headache, memory problem and confusion.   Psychiatric/Behavioral:  Positive for depressed mood and stress. Negative for agitation, behavioral problems, decreased concentration, dysphoric mood, hallucinations, self-injury, sleep disturbance, suicidal ideas and negative for hyperactivity. The patient is nervous/anxious.        Physical Exam;;  Physical Exam  Constitutional:       General: She is not in acute distress.     Appearance: She is well-developed. She is not diaphoretic.   HENT:      Head: Normocephalic and atraumatic.   Eyes:      Conjunctiva/sclera: Conjunctivae normal.   Pulmonary:      Effort: Pulmonary effort is normal. No respiratory distress.   Musculoskeletal:         General: Normal range of motion.      Cervical back: Full passive range of motion without pain and normal range of motion.   Neurological:      Mental Status: She is alert and oriented to person, place, and time.   Psychiatric:         Attention and Perception: Attention and perception normal.         Mood and Affect: Affect normal. Mood is anxious and depressed. Affect is not labile, blunt, angry or inappropriate.         Speech: Speech normal. Speech is not rapid and pressured or tangential.         Behavior: Behavior normal. Behavior is not agitated, slowed, aggressive, withdrawn, hyperactive or combative. Behavior is cooperative.         Thought Content: Thought content normal. Thought content is not paranoid or delusional.  Thought content does not include homicidal or suicidal ideation. Thought content does not include homicidal or suicidal plan.         Cognition and Memory: Cognition and memory normal.         Judgment: Judgment normal.       There were no vitals taken for this visit.  There is no height or weight on file to calculate BMI. Video appt unable to obtain.      Current Medications;;    Current Outpatient Medications:     amphetamine-dextroamphetamine (Adderall) 20 MG tablet, Take 20 mg orally every morning and afternoon., Disp: 60 tablet, Rfl: 0    ibuprofen (ADVIL,MOTRIN) 800 MG tablet, Take 1 tablet by mouth Every 6 (Six) Hours As Needed for Moderate Pain., Disp: 60 tablet, Rfl: 1    levothyroxine (SYNTHROID, LEVOTHROID) 137 MCG tablet, TAKE 1 TABLET BY MOUTH DAILY, Disp: 30 tablet, Rfl: 0    rosuvastatin (CRESTOR) 10 MG tablet, TAKE ONE TABLET BY MOUTH DAILY, Disp: 90 tablet, Rfl: 1    Lab Results:   No visits with results within 3 Month(s) from this visit.   Latest known visit with results is:   Clinical Support on 12/20/2024   Component Date Value Ref Range Status    Amphetamine Screen, Urine 12/20/2024 Positive (A)  Negative Final    AMP INTERNAL CONTROL 12/20/2024 Passed  Passed Final    Barbiturates Screen, Urine 12/20/2024 Negative  Negative Final    BARBITURATE INTERNAL CONTROL 12/20/2024 Passed  Passed Final    Benzodiazepine Screen, Urine 12/20/2024 Negative  Negative Final    BENZODIAZEPINE INTERNAL CONTROL 12/20/2024 Passed  Passed Final    Buprenorphine, Screen, Urine 12/20/2024 Negative  Negative Final    BUPRENORPHINE INTERNAL CONTROL 12/20/2024 Passed  Passed Final    Cocaine Screen, Urine 12/20/2024 Negative  Negative Final    COCAINE INTERNAL CONTROL 12/20/2024 Passed  Passed Final    MDMA (ECSTASY) 12/20/2024 Negative  Negative Final    MDMA (ECSTASY) INTERNAL CONTROL 12/20/2024 Passed  Passed Final    EDDP Screen, Urine 12/20/2024 Negative  Negative Final    EDDP INTERNAL CONTROL 12/20/2024 Passed   Passed Final    Methadone Screen, Urine 12/20/2024 Negative  Negative Final    METHADONE INTERNAL CONTROL 12/20/2024 Passed  Passed Final    Methamphetamine, Ur 12/20/2024 Negative  Negative Final    METHAMPHETAMINE INTERNAL CONTROL 12/20/2024 Passed  Passed Final    Morphine Screen, Urine 12/20/2024 Negative  Negative Final    MOR INTERNAL CONTROL 12/20/2024 Passed  Passed Final    Oxycodone Screen, Urine 12/20/2024 Negative  Negative Final    OXYCODONE INTERNAL CONTROL 12/20/2024 Passed  Passed Final    Phencyclidine (PCP), Urine 12/20/2024 Negative  Negative Final    PHENCYCLIDINE INTERNAL CONTROL 12/20/2024 Passed  Passed Final    Propoxyphene Scree, Urine 12/20/2024 Negative  Negative Final    PPX INTERNAL CONTROL 12/20/2024 Passed  Passed Final    THC, Screen, Urine 12/20/2024 Negative  Negative Final    THC INTERNAL CONTROL 12/20/2024 Passed  Passed Final    Tricyclic Antidepressants Screen 12/20/2024 Negative  Negative Final    TCA INTERNAL CONTROL 12/20/2024 Passed  Passed Final    lot:[id number]  exp:6-10-26       Mental Status Exam:   Hygiene:   good  Cooperation:  Cooperative  Eye Contact:  Good  Psychomotor Behavior:  Restless  Mood:anxious and depressed  Affect:  Appropriate  Hopelessness: Denies  Speech:  Normal  Thought Process:  Goal directed  Thought Content:  Normal  Suicidal:  None  Homicidal:  None  Hallucinations:  None  Delusion:  None  Memory:  Intact  Orientation:  Person, Place, Time, and Situation  Reliability:  good  Insight:  Good  Judgement:  Good  Impulse Control:  Good      [facility] Pollo-7 Anxiety Behav Health    Question 6/18/2025  2:58 PM EDT - Filed by Patient   In the last 2 weeks, how often have you been bothered by the following problems?    Feeling nervous, anxious, or on edge? More than half the days   Not being able to sleep or control worrying? More than half the days   Worrying too much about different things? More than half the days   Trouble relaxing? More than half the  days   Being so restless that it is hard to sit still More than half the days   Becoming easily annoyed or irritable? More than half the days   Feeling afraid, as if something awful might happen? More than half the days   If you checked any problems, how difficult have they made it for you to do your work, take care of things at home, or get along with other people? Somewhat difficult   Over the last 2 weeks, how often have you been bothered by the following problems? (range: 0 - 28) 14       PHQ-9 Depression Screening  Little interest or pleasure in doing things? (Patient-Rptd) Over half   Feeling down, depressed, or hopeless? (Patient-Rptd) Over half   PHQ-2 Total Score (Patient-Rptd) 4   Trouble falling or staying asleep, or sleeping too much? (Patient-Rptd) Over half   Feeling tired or having little energy? (Patient-Rptd) Several days   Poor appetite or overeating? (Patient-Rptd) Several days   Feeling bad about yourself - or that you are a failure or have let yourself or your family down? (Patient-Rptd) Over half   Trouble concentrating on things, such as reading the newspaper or watching television? (Patient-Rptd) Over half   Moving or speaking so slowly that other people could have noticed? Or the opposite - being so fidgety or restless that you have been moving around a lot more than usual? (Patient-Rptd) Over half   Thoughts that you would be better off dead, or of hurting yourself in some way? (Patient-Rptd) Not at all   PHQ-9 Total Score (Patient-Rptd) 14   If you checked off any problems, how difficult have these problems made it for you to do your work, take care of things at home, or get along with other people? (Patient-Rptd) Somewhat difficult     NEXT UDS DUE: 12/20/25      Assessment/Plan:  Diagnoses and all orders for this visit:    1. Attention deficit hyperactivity disorder (ADHD), combined type (Primary)    2. Dysthymia      Patient is grieving the loss of her home however she is very resilient  and is working on getting back home.  Does not want to take any medication for anxiety or depression.  We will continue medication as ordered and follow-up in 3 months.    This patient will not be seen for the in person visits due to the following exception(s): the patient does not have access to another provider in his/her area., It would be a hardship for this patient to see a provider in person., and the patient has verbalized feeling more comfortable with telehealth visits and will likely be noncompliant if referred to an in person provider.      It is my professional opinion that the patient is clinically stable and an in person visit will risk worsening the patient's condition creating undue hardship.        We discussed risks, benefits,goals and side effects of the above medication and the patient was agreeable with the plan.Patient was educated on the importance of compliance with treatment and follow-up appointments. Patient is aware to contact the [facility] [phone] with any worsening of symptoms. To call for questions or concerns and return early if necessary. Patent is agreeable to go to the Emergency Department or call 911 should they begin SI/HI.     Part of this note may be an electronic transcription/translation of spoken language to printed text using the Dragon Dictation System.    Return in about 3 months (around 9/18/2025) for Follow Up 30 min, Video visit.    TYLER Vasquez

## 2025-06-25 ENCOUNTER — TELEPHONE (OUTPATIENT)
Dept: FAMILY MEDICINE CLINIC | Facility: CLINIC | Age: 65
End: 2025-06-25
Payer: MEDICARE

## 2025-06-30 NOTE — TELEPHONE ENCOUNTER
HUB PROVIDED THE RELAY MESSAGE FROM THE OFFICE      PATIENT: VOICED UNDERSTANDING AND HAS NO FURTHER QUESTIONS AT THIS TIME    ADDITIONAL INFORMATION: PATIENT HAS NOT COMPLETED AT THIS TIME, WILL CALL TO SCHEDULE WHEN SHE HAS AVAILABILITY.

## 2025-07-03 RX ORDER — LEVOTHYROXINE SODIUM 137 UG/1
137 TABLET ORAL DAILY
Qty: 30 TABLET | Refills: 0 | Status: SHIPPED | OUTPATIENT
Start: 2025-07-03

## 2025-07-03 NOTE — TELEPHONE ENCOUNTER
HUB TO RELAY-    2x ATTEMPT TO CONTACT    CALLED PT AND LVM NOTIFYING PT OF 1X REFILL SENT ON MED. PT NEEDS TO CALL BACK AND SCHEDULE AN APPT WITH PCP FOR ANNUAL WELLNESS VISIT AND FOR FUTURE REFILLS.
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
## 2025-07-09 DIAGNOSIS — F90.2 ATTENTION DEFICIT HYPERACTIVITY DISORDER (ADHD), COMBINED TYPE: ICD-10-CM

## 2025-07-10 RX ORDER — DEXTROAMPHETAMINE SACCHARATE, AMPHETAMINE ASPARTATE, DEXTROAMPHETAMINE SULFATE AND AMPHETAMINE SULFATE 5; 5; 5; 5 MG/1; MG/1; MG/1; MG/1
TABLET ORAL
Qty: 60 TABLET | Refills: 0 | Status: SHIPPED | OUTPATIENT
Start: 2025-07-10

## 2025-08-05 RX ORDER — LEVOTHYROXINE SODIUM 137 UG/1
137 TABLET ORAL DAILY
Qty: 30 TABLET | Refills: 0 | OUTPATIENT
Start: 2025-08-05

## 2025-08-07 DIAGNOSIS — F90.2 ATTENTION DEFICIT HYPERACTIVITY DISORDER (ADHD), COMBINED TYPE: ICD-10-CM

## 2025-08-07 RX ORDER — LEVOTHYROXINE SODIUM 137 UG/1
137 TABLET ORAL DAILY
Qty: 30 TABLET | Refills: 0 | Status: SHIPPED | OUTPATIENT
Start: 2025-08-07

## 2025-08-07 RX ORDER — DEXTROAMPHETAMINE SACCHARATE, AMPHETAMINE ASPARTATE, DEXTROAMPHETAMINE SULFATE AND AMPHETAMINE SULFATE 5; 5; 5; 5 MG/1; MG/1; MG/1; MG/1
TABLET ORAL
Qty: 60 TABLET | Refills: 0 | Status: SHIPPED | OUTPATIENT
Start: 2025-08-07

## 2025-08-21 ENCOUNTER — OFFICE VISIT (OUTPATIENT)
Dept: FAMILY MEDICINE CLINIC | Facility: CLINIC | Age: 65
End: 2025-08-21
Payer: MEDICARE

## 2025-08-21 VITALS
WEIGHT: 144 LBS | DIASTOLIC BLOOD PRESSURE: 84 MMHG | OXYGEN SATURATION: 95 % | HEART RATE: 88 BPM | BODY MASS INDEX: 21.33 KG/M2 | HEIGHT: 69 IN | SYSTOLIC BLOOD PRESSURE: 144 MMHG

## 2025-08-21 DIAGNOSIS — M54.2 CHRONIC NECK PAIN: ICD-10-CM

## 2025-08-21 DIAGNOSIS — Z78.0 POSTMENOPAUSE: ICD-10-CM

## 2025-08-21 DIAGNOSIS — F90.2 ATTENTION DEFICIT HYPERACTIVITY DISORDER (ADHD), COMBINED TYPE: ICD-10-CM

## 2025-08-21 DIAGNOSIS — F31.60 BIPOLAR 1 DISORDER, MIXED: Primary | ICD-10-CM

## 2025-08-21 DIAGNOSIS — E78.2 MIXED HYPERLIPIDEMIA: ICD-10-CM

## 2025-08-21 DIAGNOSIS — G89.29 CHRONIC NECK PAIN: ICD-10-CM

## 2025-08-21 DIAGNOSIS — E55.9 VITAMIN D DEFICIENCY: ICD-10-CM

## 2025-08-21 DIAGNOSIS — Z12.31 ENCOUNTER FOR SCREENING MAMMOGRAM FOR MALIGNANT NEOPLASM OF BREAST: ICD-10-CM

## 2025-08-21 DIAGNOSIS — E03.9 ACQUIRED HYPOTHYROIDISM: ICD-10-CM

## 2025-08-21 RX ORDER — LEVOTHYROXINE SODIUM 137 UG/1
137 TABLET ORAL DAILY
Qty: 90 TABLET | Refills: 1 | Status: SHIPPED | OUTPATIENT
Start: 2025-08-21

## 2025-08-21 RX ORDER — IBUPROFEN 800 MG/1
800 TABLET, FILM COATED ORAL EVERY 6 HOURS PRN
Qty: 180 TABLET | Refills: 1 | Status: SHIPPED | OUTPATIENT
Start: 2025-08-21

## 2025-08-21 RX ORDER — ROSUVASTATIN CALCIUM 10 MG/1
10 TABLET, COATED ORAL DAILY
Qty: 90 TABLET | Refills: 1 | Status: SHIPPED | OUTPATIENT
Start: 2025-08-21

## 2025-08-22 LAB
25(OH)D3+25(OH)D2 SERPL-MCNC: 22.8 NG/ML (ref 30–100)
ALBUMIN SERPL-MCNC: 4.5 G/DL (ref 3.9–4.9)
ALP SERPL-CCNC: 107 IU/L (ref 44–121)
ALT SERPL-CCNC: 19 IU/L (ref 0–32)
AST SERPL-CCNC: 25 IU/L (ref 0–40)
BASOPHILS # BLD AUTO: 0.1 X10E3/UL (ref 0–0.2)
BASOPHILS NFR BLD AUTO: 1 %
BILIRUB SERPL-MCNC: <0.2 MG/DL (ref 0–1.2)
BUN SERPL-MCNC: 15 MG/DL (ref 8–27)
BUN/CREAT SERPL: 19 (ref 12–28)
CALCIUM SERPL-MCNC: 9.5 MG/DL (ref 8.7–10.3)
CHLORIDE SERPL-SCNC: 101 MMOL/L (ref 96–106)
CHOLEST SERPL-MCNC: 238 MG/DL (ref 100–199)
CO2 SERPL-SCNC: 21 MMOL/L (ref 20–29)
CREAT SERPL-MCNC: 0.78 MG/DL (ref 0.57–1)
EGFRCR SERPLBLD CKD-EPI 2021: 84 ML/MIN/1.73
EOSINOPHIL # BLD AUTO: 0.3 X10E3/UL (ref 0–0.4)
EOSINOPHIL NFR BLD AUTO: 6 %
ERYTHROCYTE [DISTWIDTH] IN BLOOD BY AUTOMATED COUNT: 11.7 % (ref 11.7–15.4)
GLOBULIN SER CALC-MCNC: 2.6 G/DL (ref 1.5–4.5)
GLUCOSE SERPL-MCNC: 84 MG/DL (ref 70–99)
HCT VFR BLD AUTO: 42.4 % (ref 34–46.6)
HDLC SERPL-MCNC: 54 MG/DL
HGB BLD-MCNC: 14.4 G/DL (ref 11.1–15.9)
IMM GRANULOCYTES # BLD AUTO: 0 X10E3/UL (ref 0–0.1)
IMM GRANULOCYTES NFR BLD AUTO: 0 %
LDLC SERPL CALC-MCNC: 136 MG/DL (ref 0–99)
LYMPHOCYTES # BLD AUTO: 1.8 X10E3/UL (ref 0.7–3.1)
LYMPHOCYTES NFR BLD AUTO: 33 %
MCH RBC QN AUTO: 33.2 PG (ref 26.6–33)
MCHC RBC AUTO-ENTMCNC: 34 G/DL (ref 31.5–35.7)
MCV RBC AUTO: 98 FL (ref 79–97)
MONOCYTES # BLD AUTO: 0.6 X10E3/UL (ref 0.1–0.9)
MONOCYTES NFR BLD AUTO: 11 %
NEUTROPHILS # BLD AUTO: 2.6 X10E3/UL (ref 1.4–7)
NEUTROPHILS NFR BLD AUTO: 49 %
PLATELET # BLD AUTO: 279 X10E3/UL (ref 150–450)
POTASSIUM SERPL-SCNC: 4.6 MMOL/L (ref 3.5–5.2)
PROT SERPL-MCNC: 7.1 G/DL (ref 6–8.5)
RBC # BLD AUTO: 4.34 X10E6/UL (ref 3.77–5.28)
SODIUM SERPL-SCNC: 140 MMOL/L (ref 134–144)
T4 FREE SERPL-MCNC: 0.7 NG/DL (ref 0.82–1.77)
TRIGL SERPL-MCNC: 269 MG/DL (ref 0–149)
TSH SERPL DL<=0.005 MIU/L-ACNC: 44 UIU/ML (ref 0.45–4.5)
VLDLC SERPL CALC-MCNC: 48 MG/DL (ref 5–40)
WBC # BLD AUTO: 5.4 X10E3/UL (ref 3.4–10.8)